# Patient Record
Sex: FEMALE | Race: WHITE | NOT HISPANIC OR LATINO | URBAN - METROPOLITAN AREA
[De-identification: names, ages, dates, MRNs, and addresses within clinical notes are randomized per-mention and may not be internally consistent; named-entity substitution may affect disease eponyms.]

---

## 2023-02-25 LAB
ALANINE AMINOTRANSFERASE (SGPT) (U/L) IN SER/PLAS: 72 U/L (ref 7–45)
ALBUMIN (G/DL) IN SER/PLAS: 4.7 G/DL (ref 3.4–5)
ALKALINE PHOSPHATASE (U/L) IN SER/PLAS: 65 U/L (ref 33–110)
ASPARTATE AMINOTRANSFERASE (SGOT) (U/L) IN SER/PLAS: 39 U/L (ref 9–39)
BILIRUBIN DIRECT (MG/DL) IN SER/PLAS: 0.1 MG/DL (ref 0–0.3)
BILIRUBIN TOTAL (MG/DL) IN SER/PLAS: 0.5 MG/DL (ref 0–1.2)
CHLAMYDIA TRACH., AMPLIFIED: NEGATIVE
FOLLITROPIN (IU/L) IN SER/PLAS: 53.6 IU/L
HEPATITIS A VIRUS IGM AB PRESENCE IN SER/PLAS BY IMMUNOASSAY: NONREACTIVE
HEPATITIS B VIRUS CORE IGM AB PRESENCE IN SER/PLAS BY IMMUNOASSY: NONREACTIVE
HEPATITIS B VIRUS SURFACE AG PRESENCE IN SERUM: NONREACTIVE
HEPATITIS C VIRUS AB PRESENCE IN SERUM: NONREACTIVE
HIV 1/ 2 AG/AB SCREEN: NONREACTIVE
LUTEINIZING HORMONE (IU/ML) IN SER/PLAS: 33.5 IU/L
N. GONORRHEA, AMPLIFIED: NEGATIVE
PROTEIN TOTAL: 7.9 G/DL (ref 6.4–8.2)

## 2023-03-01 LAB — HERPES SIMPLEX VIRUS I/II ANTIBODY, IGM: 0.95 IV

## 2023-03-03 LAB
Lab: ABNORMAL
Lab: NORMAL

## 2023-03-13 ENCOUNTER — TELEMEDICINE (OUTPATIENT)
Dept: PRIMARY CARE | Facility: CLINIC | Age: 58
End: 2023-03-13
Payer: COMMERCIAL

## 2023-03-13 DIAGNOSIS — M19.90 ARTHRITIS: ICD-10-CM

## 2023-03-13 DIAGNOSIS — R05.1 ACUTE COUGH: ICD-10-CM

## 2023-03-13 DIAGNOSIS — F17.200 SMOKER: Primary | ICD-10-CM

## 2023-03-13 DIAGNOSIS — E24.0 CUSHING'S DISEASE (MULTI): ICD-10-CM

## 2023-03-13 PROCEDURE — 99213 OFFICE O/P EST LOW 20 MIN: CPT | Performed by: INTERNAL MEDICINE

## 2023-03-13 RX ORDER — LORATADINE 10 MG/1
10 TABLET ORAL DAILY
Qty: 30 TABLET | Refills: 2 | Status: SHIPPED | OUTPATIENT
Start: 2023-03-13 | End: 2024-01-24 | Stop reason: ALTCHOICE

## 2023-03-13 RX ORDER — LEVOFLOXACIN 250 MG/1
250 TABLET ORAL DAILY
Qty: 10 TABLET | Refills: 0 | Status: SHIPPED | OUTPATIENT
Start: 2023-03-13 | End: 2023-03-23

## 2023-03-13 RX ORDER — FLUTICASONE FUROATE 27.5 UG/1
1 SPRAY, METERED NASAL
Qty: 10 G | Refills: 5 | Status: SHIPPED | OUTPATIENT
Start: 2023-03-13 | End: 2024-01-24 | Stop reason: ALTCHOICE

## 2023-03-13 RX ORDER — BROMPHENIRAMINE MALEATE, PSEUDOEPHEDRINE HYDROCHLORIDE, AND DEXTROMETHORPHAN HYDROBROMIDE 2; 30; 10 MG/5ML; MG/5ML; MG/5ML
5 SYRUP ORAL 4 TIMES DAILY PRN
Qty: 120 ML | Refills: 0 | Status: SHIPPED | OUTPATIENT
Start: 2023-03-13 | End: 2023-03-23

## 2023-03-13 NOTE — PROGRESS NOTES
VIRTUAL VISIT    NAME OF THE PATIENT: Kallie Dale    DATE OF BIRTH:     CHIEF COMPLAINT:  The patient today came here for a virtual visit.  Cough, yellow sputum, sinus congestion, bronchitis, headache, postnasal congestion going on for the last several days.  Over-the-counter medications not helping.  She had a COVID two weeks ago, it was negative.    PAST MEDICAL HISTORY:  Reviewed on EMR, unchanged.    CURRENT MEDICATIONS:  Reviewed on EMR, unchanged.  List reviewed.    ALLERGIES:  Reviewed on EMR, unchanged.    SOCIAL HISTORY:  Reviewed on EMR, unchanged.  She does not smoke and does not drink alcohol.    FAMILY HISTORY:  Reviewed on EMR, unchanged.    REVIEW OF SYSTEMS:  All 12 systems reviewed and pertaining covered in history and physical.    PHYSICAL EXAMINATION  Virtual exam.  Nose and throat congested.  Postnasal drip.  Bilateral wheezing present.    LAB WORK:  Laboratory testing discussed.    ASSESSMENT AND PLAN:  Rhinosinusitis, pharyngitis, and bronchitis.  Saline gargles and steam inhalation.  Levaquin, Claritin, Robitussin, Flonase, Symbicort and cough syrup.  Follow up in two weeks if not better.  Continue to follow.      Kindly review this note in conjunction with EMR.     Subjective   Patient ID: Kallie Dale is a 57 y.o. female who presents for No chief complaint on file..      HPI    Review of Systems    Objective   There were no vitals taken for this visit.      Physical Exam    Assessment/Plan   Problem List Items Addressed This Visit    None  Visit Diagnoses       Smoker    -  Primary    Arthritis        Cushing's disease (CMS/HCC)        Acute cough

## 2023-03-19 DIAGNOSIS — I10 PRIMARY HYPERTENSION: Primary | ICD-10-CM

## 2023-03-19 RX ORDER — HYDROCHLOROTHIAZIDE 25 MG/1
TABLET ORAL
Qty: 25 TABLET | Refills: 2 | Status: SHIPPED | OUTPATIENT
Start: 2023-03-19 | End: 2024-01-24 | Stop reason: ALTCHOICE

## 2023-03-21 ENCOUNTER — TELEMEDICINE (OUTPATIENT)
Dept: PHARMACY | Facility: HOSPITAL | Age: 58
End: 2023-03-21
Payer: COMMERCIAL

## 2023-03-21 RX ORDER — SEMAGLUTIDE 1.34 MG/ML
0.5 INJECTION, SOLUTION SUBCUTANEOUS
Qty: 1.5 ML | Refills: 0 | Status: SHIPPED | OUTPATIENT
Start: 2023-03-21 | End: 2023-04-18 | Stop reason: ALTCHOICE

## 2023-03-21 NOTE — PROGRESS NOTES
Subjective   Patient ID: Kallie Dale is a 57 y.o. female who presents for Ozempic management.    Referring Provider: Lisa Carrillo MD     HPI    Review of Systems    Objective     There were no vitals taken for this visit.     Labs  Lab Results   Component Value Date    BILITOT 0.5 02/24/2023    CALCIUM 9.3 11/02/2022    CO2 27 11/02/2022     11/02/2022    CREATININE 0.96 11/02/2022    GLUCOSE 123 (H) 11/02/2022    ALKPHOS 65 02/24/2023    K 3.7 11/02/2022    PROT 7.9 02/24/2023     11/02/2022    AST 39 02/24/2023    ALT 72 (H) 02/24/2023    BUN 6 11/02/2022    ANIONGAP 19 11/02/2022     12/06/2021    ALBUMIN 4.7 02/24/2023    AMYLASE 38 03/09/2021    LIPASE 61 03/09/2021    GFRF 69 11/02/2022     Lab Results   Component Value Date    TRIG 110 09/11/2020    CHOL 212 (H) 09/11/2020    HDL 76.0 09/11/2020     Lab Results   Component Value Date    HGBA1C 5.6 11/02/2022       Assessment/Plan         Patient is doing well on medication will increase her to 0.5mg dose for weight loss. PA was denied, but patient is okay paying cash.    Diamond Holt, Mary AnnD

## 2023-04-18 ENCOUNTER — TELEMEDICINE (OUTPATIENT)
Dept: PHARMACY | Facility: HOSPITAL | Age: 58
End: 2023-04-18
Payer: COMMERCIAL

## 2023-04-18 RX ORDER — SEMAGLUTIDE 1.34 MG/ML
1 INJECTION, SOLUTION SUBCUTANEOUS
Qty: 3 ML | Refills: 0 | Status: SHIPPED | OUTPATIENT
Start: 2023-04-18 | End: 2024-02-12 | Stop reason: WASHOUT

## 2023-04-18 NOTE — PROGRESS NOTES
Subjective   Patient ID: Kallie Dale is a 57 y.o. female who presents for Obesity (Ozempic titration). No weight loss reproted at this time, but tolerating well. Just took second dose of Ozempic 0.5 mg.     Objective   There were no vitals taken for this visit.    Physical Exam    Assessment/Plan   Diagnoses and all orders for this visit:  Body mass index 36.0-36.9, adult  -     Follow Up In [facility]    Patient tolerating Ozempic 0.5 mg weekly well; has taken two doses. Will uptitrate in 2 weeks to 1 mg weekly. Follow-up in 4 weeks.

## 2023-04-18 NOTE — PATIENT INSTRUCTIONS
Ozempic Education:     - Counseled patient on Ozempic MOA, expectations, side effects, duration of therapy, administration, and monitoring parameters.  - Provided detailed dosing and administration counseling to ensure proper technique.   - Reviewed Ozempic titration schedule, starting with 0.25 mg once weekly for 4 weeks, then continuing on 0.5 mg once weekly. Pt verbalized understanding.  - Counseled patient on the benefits of GLP-1ra, such as cardiovascular risk reduction, glycemic control, and weight loss potential.  - Reviewed storage requirements of Ozempic when not in use, and when to administer the medication if a dose is missed.  - Advised patient that they may experience improved satiety after meals and portion sizes of meals may be reduced as doses of Ozempic increase.  - Counseled patient to avoid foods that are fatty/oily as this may precipitate the nausea/GI upset that may occur with new start Ozempic.

## 2023-05-10 ENCOUNTER — APPOINTMENT (OUTPATIENT)
Dept: PHARMACY | Facility: HOSPITAL | Age: 58
End: 2023-05-10
Payer: COMMERCIAL

## 2023-07-10 DIAGNOSIS — F41.9 ANXIETY: ICD-10-CM

## 2023-07-10 DIAGNOSIS — I10 HYPERTENSION, UNSPECIFIED TYPE: ICD-10-CM

## 2023-07-10 DIAGNOSIS — K21.9 GASTROESOPHAGEAL REFLUX DISEASE, UNSPECIFIED WHETHER ESOPHAGITIS PRESENT: ICD-10-CM

## 2023-07-10 DIAGNOSIS — N94.9 VAGINAL BURNING: ICD-10-CM

## 2023-07-10 RX ORDER — VALACYCLOVIR HYDROCHLORIDE 500 MG/1
500 TABLET, FILM COATED ORAL 3 TIMES DAILY
COMMUNITY
Start: 2023-03-04 | End: 2023-07-10 | Stop reason: SDUPTHER

## 2023-07-10 RX ORDER — METOPROLOL SUCCINATE 50 MG/1
TABLET, EXTENDED RELEASE ORAL
Qty: 30 TABLET | Refills: 0 | Status: SHIPPED | OUTPATIENT
Start: 2023-07-10 | End: 2023-08-03

## 2023-07-10 RX ORDER — OMEPRAZOLE 40 MG/1
CAPSULE, DELAYED RELEASE ORAL
Qty: 90 CAPSULE | Refills: 1 | Status: SHIPPED | OUTPATIENT
Start: 2023-07-10

## 2023-07-10 RX ORDER — ESCITALOPRAM OXALATE 20 MG/1
20 TABLET ORAL DAILY
Qty: 30 TABLET | Refills: 0 | Status: SHIPPED | OUTPATIENT
Start: 2023-07-10 | End: 2023-09-27

## 2023-07-10 RX ORDER — VALACYCLOVIR HYDROCHLORIDE 500 MG/1
500 TABLET, FILM COATED ORAL 3 TIMES DAILY
Qty: 30 TABLET | Refills: 0 | Status: SHIPPED | OUTPATIENT
Start: 2023-07-10 | End: 2023-07-20

## 2023-07-10 RX ORDER — ESCITALOPRAM OXALATE 20 MG/1
20 TABLET ORAL DAILY
COMMUNITY
Start: 2023-04-18 | End: 2023-07-10 | Stop reason: SDUPTHER

## 2023-08-03 DIAGNOSIS — I10 HYPERTENSION, UNSPECIFIED TYPE: ICD-10-CM

## 2023-08-03 RX ORDER — METOPROLOL SUCCINATE 50 MG/1
TABLET, EXTENDED RELEASE ORAL
Qty: 30 TABLET | Refills: 0 | Status: SHIPPED | OUTPATIENT
Start: 2023-08-03 | End: 2023-08-22

## 2023-08-17 ENCOUNTER — OFFICE VISIT (OUTPATIENT)
Dept: PRIMARY CARE | Facility: CLINIC | Age: 58
End: 2023-08-17
Payer: COMMERCIAL

## 2023-08-17 VITALS
SYSTOLIC BLOOD PRESSURE: 138 MMHG | HEIGHT: 64 IN | BODY MASS INDEX: 34.49 KG/M2 | DIASTOLIC BLOOD PRESSURE: 72 MMHG | WEIGHT: 202 LBS

## 2023-08-17 DIAGNOSIS — R07.9 CHEST PAIN, UNSPECIFIED TYPE: ICD-10-CM

## 2023-08-17 DIAGNOSIS — E78.5 HYPERLIPIDEMIA, UNSPECIFIED HYPERLIPIDEMIA TYPE: ICD-10-CM

## 2023-08-17 DIAGNOSIS — R05.1 ACUTE COUGH: ICD-10-CM

## 2023-08-17 DIAGNOSIS — R73.03 PREDIABETES: ICD-10-CM

## 2023-08-17 PROBLEM — I82.409 DVT (DEEP VENOUS THROMBOSIS) (MULTI): Status: ACTIVE | Noted: 2023-08-17

## 2023-08-17 PROBLEM — M19.90 ARTHRITIS: Status: ACTIVE | Noted: 2023-08-17

## 2023-08-17 PROBLEM — T78.40XA ALLERGIES: Status: ACTIVE | Noted: 2023-08-17

## 2023-08-17 PROBLEM — K92.1 BLACK STOOL: Status: ACTIVE | Noted: 2023-08-17

## 2023-08-17 PROBLEM — F41.8 DEPRESSION WITH ANXIETY: Status: ACTIVE | Noted: 2023-08-17

## 2023-08-17 PROBLEM — L70.9 ACNE: Status: ACTIVE | Noted: 2023-08-17

## 2023-08-17 PROBLEM — R10.9 ABDOMINAL PAIN: Status: ACTIVE | Noted: 2023-08-17

## 2023-08-17 PROBLEM — R63.5 ABNORMAL WEIGHT GAIN: Status: ACTIVE | Noted: 2023-08-17

## 2023-08-17 PROBLEM — B37.9 CANDIDIASIS: Status: ACTIVE | Noted: 2023-08-17

## 2023-08-17 PROBLEM — F41.9 ANXIETY: Status: ACTIVE | Noted: 2023-08-17

## 2023-08-17 PROCEDURE — 93000 ELECTROCARDIOGRAM COMPLETE: CPT | Performed by: INTERNAL MEDICINE

## 2023-08-17 PROCEDURE — 1036F TOBACCO NON-USER: CPT | Performed by: INTERNAL MEDICINE

## 2023-08-17 PROCEDURE — 99214 OFFICE O/P EST MOD 30 MIN: CPT | Performed by: INTERNAL MEDICINE

## 2023-08-17 RX ORDER — ALBUTEROL SULFATE 90 UG/1
AEROSOL, METERED RESPIRATORY (INHALATION)
COMMUNITY
Start: 2023-02-23 | End: 2024-01-24 | Stop reason: ALTCHOICE

## 2023-09-27 DIAGNOSIS — F41.9 ANXIETY: ICD-10-CM

## 2023-09-27 RX ORDER — ESCITALOPRAM OXALATE 20 MG/1
20 TABLET ORAL DAILY
Qty: 30 TABLET | Refills: 2 | Status: SHIPPED | OUTPATIENT
Start: 2023-09-27 | End: 2024-02-25

## 2023-10-09 DIAGNOSIS — R63.5 ABNORMAL WEIGHT GAIN: ICD-10-CM

## 2023-10-11 ENCOUNTER — PHARMACY VISIT (OUTPATIENT)
Dept: PHARMACY | Facility: CLINIC | Age: 58
End: 2023-10-11

## 2023-10-11 PROCEDURE — RXMED WILLOW AMBULATORY MEDICATION CHARGE

## 2023-10-24 DIAGNOSIS — N94.9 VAGINAL BURNING: ICD-10-CM

## 2023-10-24 PROBLEM — K21.9 GERD (GASTROESOPHAGEAL REFLUX DISEASE): Status: ACTIVE | Noted: 2023-10-24

## 2023-10-24 PROBLEM — E66.812 OBESITY, CLASS II, BMI 35-39.9: Status: ACTIVE | Noted: 2023-07-26

## 2023-10-24 PROBLEM — E66.3 OVERWEIGHT: Status: ACTIVE | Noted: 2023-10-24

## 2023-10-24 PROBLEM — R21 RASH AND OTHER NONSPECIFIC SKIN ERUPTION: Status: ACTIVE | Noted: 2022-04-06

## 2023-10-24 PROBLEM — N89.8 VAGINAL DISCHARGE: Status: ACTIVE | Noted: 2023-10-24

## 2023-10-24 PROBLEM — E66.9 OBESITY, CLASS II, BMI 35-39.9: Status: ACTIVE | Noted: 2023-07-26

## 2023-10-24 PROBLEM — G47.00 INSOMNIA: Status: ACTIVE | Noted: 2023-10-24

## 2023-10-24 PROBLEM — M46.40: Status: ACTIVE | Noted: 2023-10-24

## 2023-10-24 PROBLEM — M70.62 TROCHANTERIC BURSITIS OF LEFT HIP: Status: ACTIVE | Noted: 2023-10-24

## 2023-10-24 PROBLEM — H66.90 EAR INFECTION: Status: ACTIVE | Noted: 2023-10-24

## 2023-10-24 PROBLEM — M48.061 LUMBAR CANAL STENOSIS: Status: ACTIVE | Noted: 2023-10-24

## 2023-10-24 PROBLEM — L21.9 SEBORRHEIC DERMATITIS, UNSPECIFIED: Status: ACTIVE | Noted: 2022-04-06

## 2023-10-24 PROBLEM — B34.9 VIRAL INFECTION: Status: ACTIVE | Noted: 2023-10-24

## 2023-10-24 PROBLEM — L40.0 PSORIASIS VULGARIS: Status: ACTIVE | Noted: 2022-04-06

## 2023-10-24 PROBLEM — M25.519 SHOULDER PAIN: Status: ACTIVE | Noted: 2023-10-24

## 2023-10-24 PROBLEM — R51.9 HEADACHE: Status: ACTIVE | Noted: 2023-10-24

## 2023-10-24 PROBLEM — R63.4 WEIGHT LOSS: Status: ACTIVE | Noted: 2023-10-24

## 2023-10-24 PROBLEM — G25.81 RESTLESS LEGS SYNDROME: Status: ACTIVE | Noted: 2023-10-24

## 2023-10-24 PROBLEM — M54.50 LOWER BACK PAIN: Status: ACTIVE | Noted: 2023-10-24

## 2023-10-24 PROBLEM — I10 HYPERTENSION: Status: ACTIVE | Noted: 2023-10-24

## 2023-10-24 PROBLEM — K22.10 ULCER, ESOPHAGUS: Status: ACTIVE | Noted: 2023-10-24

## 2023-10-24 PROBLEM — J02.9 SORE THROAT: Status: ACTIVE | Noted: 2023-10-24

## 2023-10-24 RX ORDER — VALACYCLOVIR HYDROCHLORIDE 500 MG/1
500 TABLET, FILM COATED ORAL DAILY
Qty: 30 TABLET | Refills: 1 | Status: SHIPPED | OUTPATIENT
Start: 2023-10-24 | End: 2023-11-06 | Stop reason: SDUPTHER

## 2023-11-01 ENCOUNTER — PHARMACY VISIT (OUTPATIENT)
Dept: PHARMACY | Facility: CLINIC | Age: 58
End: 2023-11-01

## 2023-11-01 DIAGNOSIS — R63.5 ABNORMAL WEIGHT GAIN: ICD-10-CM

## 2023-11-01 RX ORDER — SEMAGLUTIDE 0.68 MG/ML
0.5 INJECTION, SOLUTION SUBCUTANEOUS
Qty: 3 ML | Refills: 0 | Status: SHIPPED | OUTPATIENT
Start: 2023-11-01 | End: 2024-01-09 | Stop reason: ENTERED-IN-ERROR

## 2023-11-03 ENCOUNTER — APPOINTMENT (OUTPATIENT)
Dept: PRIMARY CARE | Facility: CLINIC | Age: 58
End: 2023-11-03
Payer: COMMERCIAL

## 2023-11-06 ENCOUNTER — OFFICE VISIT (OUTPATIENT)
Dept: PRIMARY CARE | Facility: CLINIC | Age: 58
End: 2023-11-06
Payer: COMMERCIAL

## 2023-11-06 VITALS — HEIGHT: 64 IN | DIASTOLIC BLOOD PRESSURE: 74 MMHG | SYSTOLIC BLOOD PRESSURE: 128 MMHG | BODY MASS INDEX: 34.67 KG/M2

## 2023-11-06 DIAGNOSIS — N94.9 VAGINAL BURNING: ICD-10-CM

## 2023-11-06 DIAGNOSIS — F41.9 ANXIETY AND DEPRESSION: ICD-10-CM

## 2023-11-06 DIAGNOSIS — F32.A ANXIETY AND DEPRESSION: ICD-10-CM

## 2023-11-06 DIAGNOSIS — I10 PRIMARY HYPERTENSION: ICD-10-CM

## 2023-11-06 DIAGNOSIS — R73.03 PREDIABETES: Primary | ICD-10-CM

## 2023-11-06 DIAGNOSIS — E78.5 HYPERLIPIDEMIA, UNSPECIFIED HYPERLIPIDEMIA TYPE: ICD-10-CM

## 2023-11-06 PROCEDURE — 3008F BODY MASS INDEX DOCD: CPT | Performed by: INTERNAL MEDICINE

## 2023-11-06 PROCEDURE — 99213 OFFICE O/P EST LOW 20 MIN: CPT | Performed by: INTERNAL MEDICINE

## 2023-11-06 PROCEDURE — 3074F SYST BP LT 130 MM HG: CPT | Performed by: INTERNAL MEDICINE

## 2023-11-06 PROCEDURE — 1036F TOBACCO NON-USER: CPT | Performed by: INTERNAL MEDICINE

## 2023-11-06 PROCEDURE — 3078F DIAST BP <80 MM HG: CPT | Performed by: INTERNAL MEDICINE

## 2023-11-06 RX ORDER — VALACYCLOVIR HYDROCHLORIDE 500 MG/1
500 TABLET, FILM COATED ORAL DAILY
Qty: 30 TABLET | Refills: 1 | Status: SHIPPED | OUTPATIENT
Start: 2023-11-06 | End: 2023-11-30

## 2023-11-07 PROBLEM — N94.9 VAGINAL BURNING: Status: ACTIVE | Noted: 2023-11-07

## 2023-11-07 PROBLEM — F41.9 ANXIETY AND DEPRESSION: Status: ACTIVE | Noted: 2023-11-07

## 2023-11-07 PROBLEM — E78.5 HYPERLIPIDEMIA: Status: ACTIVE | Noted: 2023-11-07

## 2023-11-07 PROBLEM — R73.03 PREDIABETES: Status: ACTIVE | Noted: 2023-11-07

## 2023-11-07 PROBLEM — N94.89 VAGINAL BURNING: Status: ACTIVE | Noted: 2023-11-07

## 2023-11-07 PROBLEM — F32.A ANXIETY AND DEPRESSION: Status: ACTIVE | Noted: 2023-11-07

## 2023-11-07 NOTE — PROGRESS NOTES
"Subjective   Patient ID: Kallie Dale is a 58 y.o. female who presents for Follow-up (multiple medical issues.).    Kallie Dale today came here for multiple medical issues.  She lost eight pounds in the last one month of Ozempic, high-dose.  She wants to start again.  Appetite and weight are okay.  No problem.  She came here for follow-up on various conditions.  No side effects.    I have personally reviewed the patient's Past Medical History, Medications, Allergies, Social History, and Family History in the EMR.    Review of Systems   All other systems reviewed and are negative.    Objective   /74   Ht 1.626 m (5' 4\")   BMI 34.67 kg/m²     Physical Exam  Vitals reviewed.   Cardiovascular:      Heart sounds: Normal heart sounds, S1 normal and S2 normal. No murmur heard.     No friction rub.   Pulmonary:      Effort: Pulmonary effort is normal.      Breath sounds: Normal breath sounds and air entry.   Abdominal:      Palpations: There is no hepatomegaly, splenomegaly or mass.   Musculoskeletal:      Right lower leg: No edema.      Left lower leg: No edema.   Lymphadenopathy:      Lower Body: No right inguinal adenopathy. No left inguinal adenopathy.   Neurological:      Cranial Nerves: Cranial nerves 2-12 are intact.      Sensory: No sensory deficit.      Motor: Motor function is intact.      Deep Tendon Reflexes: Reflexes are normal and symmetric.     LAB WORK: Laboratory testing discussed.    Assessment/Plan   Problem List Items Addressed This Visit             ICD-10-CM       Cardiac and Vasculature    Hypertension I10    Relevant Orders    CBC    Urinalysis with Reflex Microscopic    TSH     Other Visit Diagnoses         Codes    Anxiety and depression    -  Primary F41.9, F32.A    Vaginal burning     N94.9    Relevant Medications    valACYclovir (Valtrex) 500 mg tablet    Prediabetes     R73.03    Relevant Orders    Hemoglobin A1c    Hyperlipidemia, unspecified hyperlipidemia type     E78.5    " Relevant Orders    Comprehensive metabolic panel    Lipid panel        1. Pre-diabetic, diabetic.  Ozempic is helping.  Go with high dose.  2. Anxiety and depression, okay.  3. Hypertension, okay.  Metoprolol.  No side effects.  4. Anxiety, stable.  5. I shall see her to do blood test.  6. We are going to call Ozempic now.  I told her there is ______ cash pay, but she might be a candidate, she might be able to get dispensing pharmacy, cheaper option.  She is willing to try.    Scribe Attestation  By signing my name below, I, Juan Piña attest that this documentation has been prepared under the direction and in the presence of Bertram Carrillo MD.

## 2023-11-30 DIAGNOSIS — N94.9 VAGINAL BURNING: ICD-10-CM

## 2023-11-30 RX ORDER — VALACYCLOVIR HYDROCHLORIDE 500 MG/1
500 TABLET, FILM COATED ORAL DAILY
Qty: 90 TABLET | Refills: 1 | Status: SHIPPED | OUTPATIENT
Start: 2023-11-30

## 2024-01-06 ENCOUNTER — APPOINTMENT (OUTPATIENT)
Dept: PRIMARY CARE | Facility: CLINIC | Age: 59
End: 2024-01-06
Payer: COMMERCIAL

## 2024-01-08 ENCOUNTER — OFFICE VISIT (OUTPATIENT)
Dept: PRIMARY CARE | Facility: CLINIC | Age: 59
End: 2024-01-08
Payer: COMMERCIAL

## 2024-01-08 ENCOUNTER — LAB (OUTPATIENT)
Dept: LAB | Facility: LAB | Age: 59
End: 2024-01-08
Payer: COMMERCIAL

## 2024-01-08 VITALS — SYSTOLIC BLOOD PRESSURE: 126 MMHG | DIASTOLIC BLOOD PRESSURE: 74 MMHG

## 2024-01-08 DIAGNOSIS — I10 PRIMARY HYPERTENSION: ICD-10-CM

## 2024-01-08 DIAGNOSIS — B00.9 HERPES SIMPLEX INFECTION: ICD-10-CM

## 2024-01-08 DIAGNOSIS — F41.9 ANXIETY: ICD-10-CM

## 2024-01-08 DIAGNOSIS — M54.9 BACK PAIN, UNSPECIFIED BACK LOCATION, UNSPECIFIED BACK PAIN LATERALITY, UNSPECIFIED CHRONICITY: ICD-10-CM

## 2024-01-08 DIAGNOSIS — M48.00 SPINAL STENOSIS, UNSPECIFIED SPINAL REGION: ICD-10-CM

## 2024-01-08 DIAGNOSIS — R63.5 WEIGHT GAIN: ICD-10-CM

## 2024-01-08 DIAGNOSIS — R39.9 UTI SYMPTOMS: ICD-10-CM

## 2024-01-08 DIAGNOSIS — E78.5 HYPERLIPIDEMIA, UNSPECIFIED HYPERLIPIDEMIA TYPE: ICD-10-CM

## 2024-01-08 DIAGNOSIS — R35.0 INCREASED FREQUENCY OF MICTURITION: Primary | ICD-10-CM

## 2024-01-08 DIAGNOSIS — R73.03 PRE-DIABETES: ICD-10-CM

## 2024-01-08 DIAGNOSIS — R32 URINARY INCONTINENCE, UNSPECIFIED TYPE: ICD-10-CM

## 2024-01-08 LAB
ERYTHROCYTE [DISTWIDTH] IN BLOOD BY AUTOMATED COUNT: 12.8 % (ref 11.5–14.5)
HCT VFR BLD AUTO: 41.8 % (ref 36–46)
HGB BLD-MCNC: 13.5 G/DL (ref 12–16)
MCH RBC QN AUTO: 31.2 PG (ref 26–34)
MCHC RBC AUTO-ENTMCNC: 32.3 G/DL (ref 32–36)
MCV RBC AUTO: 97 FL (ref 80–100)
NRBC BLD-RTO: 0.3 /100 WBCS (ref 0–0)
PLATELET # BLD AUTO: 267 X10*3/UL (ref 150–450)
RBC # BLD AUTO: 4.33 X10*6/UL (ref 4–5.2)
WBC # BLD AUTO: 6.9 X10*3/UL (ref 4.4–11.3)

## 2024-01-08 PROCEDURE — 3008F BODY MASS INDEX DOCD: CPT | Performed by: INTERNAL MEDICINE

## 2024-01-08 PROCEDURE — 36415 COLL VENOUS BLD VENIPUNCTURE: CPT

## 2024-01-08 PROCEDURE — 3078F DIAST BP <80 MM HG: CPT | Performed by: INTERNAL MEDICINE

## 2024-01-08 PROCEDURE — 84443 ASSAY THYROID STIM HORMONE: CPT

## 2024-01-08 PROCEDURE — 3074F SYST BP LT 130 MM HG: CPT | Performed by: INTERNAL MEDICINE

## 2024-01-08 PROCEDURE — 1036F TOBACCO NON-USER: CPT | Performed by: INTERNAL MEDICINE

## 2024-01-08 PROCEDURE — 85027 COMPLETE CBC AUTOMATED: CPT

## 2024-01-08 PROCEDURE — 80053 COMPREHEN METABOLIC PANEL: CPT

## 2024-01-08 PROCEDURE — 99214 OFFICE O/P EST MOD 30 MIN: CPT | Performed by: INTERNAL MEDICINE

## 2024-01-09 LAB
ALBUMIN SERPL BCP-MCNC: 4.3 G/DL (ref 3.4–5)
ALP SERPL-CCNC: 57 U/L (ref 33–110)
ALT SERPL W P-5'-P-CCNC: 25 U/L (ref 7–45)
ANION GAP SERPL CALC-SCNC: 16 MMOL/L (ref 10–20)
AST SERPL W P-5'-P-CCNC: 21 U/L (ref 9–39)
BILIRUB SERPL-MCNC: 0.5 MG/DL (ref 0–1.2)
BUN SERPL-MCNC: 10 MG/DL (ref 6–23)
CALCIUM SERPL-MCNC: 9.7 MG/DL (ref 8.6–10.6)
CHLORIDE SERPL-SCNC: 103 MMOL/L (ref 98–107)
CO2 SERPL-SCNC: 26 MMOL/L (ref 21–32)
CREAT SERPL-MCNC: 0.81 MG/DL (ref 0.5–1.05)
EGFRCR SERPLBLD CKD-EPI 2021: 84 ML/MIN/1.73M*2
GLUCOSE SERPL-MCNC: 96 MG/DL (ref 74–99)
POTASSIUM SERPL-SCNC: 4.8 MMOL/L (ref 3.5–5.3)
PROT SERPL-MCNC: 7 G/DL (ref 6.4–8.2)
SODIUM SERPL-SCNC: 140 MMOL/L (ref 136–145)
TSH SERPL-ACNC: 1.97 MIU/L (ref 0.44–3.98)

## 2024-01-09 NOTE — PROGRESS NOTES
Subjective   Patient ID: Kallie Dale is a 58 y.o. female who presents for Follow-up (multiple medical issues.).    Kallie Dale today came here for multiple medical issues.  1. Sudden loss of control of urine and bowel.  Every time she has urine comes out.  She has spinal stenosis.  She thinks it is related to that.  Urinary incontinence.  She is very worried about it.  2. Follow-up on other conditions.  She is ready take the second dose of Ozempic to lose the weight.  It is helping her.  Feeling tired, fatigued, and exhausted.  She is very depressed because of concentration.    I have personally reviewed the patient's Past Medical History, Medications, Allergies, Social History, and Family History in the EMR.    Review of Systems   All other systems reviewed and are negative.    Objective   /74     Physical Exam  Vitals reviewed.   Cardiovascular:      Heart sounds: Normal heart sounds, S1 normal and S2 normal. No murmur heard.     No friction rub.   Pulmonary:      Effort: Pulmonary effort is normal.      Breath sounds: Normal breath sounds and air entry.   Abdominal:      Palpations: There is no hepatomegaly, splenomegaly or mass.   Musculoskeletal:      Right lower leg: No edema.      Left lower leg: No edema.      Comments: EXTREMITIES: Movements painful.   Lymphadenopathy:      Lower Body: No right inguinal adenopathy. No left inguinal adenopathy.   Neurological:      Cranial Nerves: Cranial nerves 2-12 are intact.      Sensory: No sensory deficit.      Motor: Motor function is intact.      Deep Tendon Reflexes: Reflexes are normal and symmetric.     LAB WORK:  Laboratory testing discussed.    Assessment/Plan   Problem List Items Addressed This Visit             ICD-10-CM       Cardiac and Vasculature    Hypertension I10    Relevant Orders    CBC (Completed)    TSH (Completed)    Hyperlipidemia E78.5    Relevant Orders    Comprehensive metabolic panel (Completed)       Mental Health    Anxiety  F41.9     Other Visit Diagnoses         Codes    Increased frequency of micturition    -  Primary R35.0    Urinary incontinence, unspecified type     R32    Relevant Orders    US renal complete    US bladder    US PELVIS TRANSABDOMINAL WITH TRANSVAGINAL    Referral to Urology    Back pain, unspecified back location, unspecified back pain laterality, unspecified chronicity     M54.9    Relevant Orders    Referral to Neurology    UTI symptoms     R39.9    Spinal stenosis, unspecified spinal region     M48.00    Weight gain     R63.5    Pre-diabetes     R73.03    Herpes simplex infection     B00.9        1. Increased frequency of urine, UTI symptoms.  Ultrasound of the kidney, bladder and pelvis.  2. Sudden urinary incontinence, spinal stenosis.  Refer to Dr. Jase Palmer.  She has an MRI at the [facility].  3. Weight gain.  I ordered thyroid and fasting sugar.  4. Pre-diabetic and weight.  Wegovy working, higher dose given.  5. Blood pressure and hypertension, okay.  6. Anxiety.  Monitor.  7. Herpes simplex infection getting better.  8. Follow-up appointment with me in a week after all the testing.    Scribe Attestation  By signing my name below, I, Juan Piña attest that this documentation has been prepared under the direction and in the presence of Bertram Carrillo MD.

## 2024-01-11 ENCOUNTER — OFFICE VISIT (OUTPATIENT)
Dept: PRIMARY CARE | Facility: CLINIC | Age: 59
End: 2024-01-11
Payer: COMMERCIAL

## 2024-01-11 VITALS
BODY MASS INDEX: 38.58 KG/M2 | HEIGHT: 64 IN | SYSTOLIC BLOOD PRESSURE: 142 MMHG | WEIGHT: 226 LBS | DIASTOLIC BLOOD PRESSURE: 86 MMHG

## 2024-01-11 DIAGNOSIS — F41.9 ANXIETY AND DEPRESSION: ICD-10-CM

## 2024-01-11 DIAGNOSIS — I10 HYPERTENSION, UNSPECIFIED TYPE: ICD-10-CM

## 2024-01-11 DIAGNOSIS — F32.A ANXIETY AND DEPRESSION: ICD-10-CM

## 2024-01-11 DIAGNOSIS — R53.83 FATIGUE, UNSPECIFIED TYPE: Primary | ICD-10-CM

## 2024-01-11 DIAGNOSIS — A60.00 GENITAL HERPES SIMPLEX, UNSPECIFIED SITE: ICD-10-CM

## 2024-01-11 PROCEDURE — 3008F BODY MASS INDEX DOCD: CPT | Performed by: INTERNAL MEDICINE

## 2024-01-11 PROCEDURE — 3079F DIAST BP 80-89 MM HG: CPT | Performed by: INTERNAL MEDICINE

## 2024-01-11 PROCEDURE — 1036F TOBACCO NON-USER: CPT | Performed by: INTERNAL MEDICINE

## 2024-01-11 PROCEDURE — 3077F SYST BP >= 140 MM HG: CPT | Performed by: INTERNAL MEDICINE

## 2024-01-11 PROCEDURE — 99214 OFFICE O/P EST MOD 30 MIN: CPT | Performed by: INTERNAL MEDICINE

## 2024-01-11 ASSESSMENT — ENCOUNTER SYMPTOMS
OCCASIONAL FEELINGS OF UNSTEADINESS: 0
DEPRESSION: 0
LOSS OF SENSATION IN FEET: 0

## 2024-01-12 NOTE — PROGRESS NOTES
2024        Ref:  Kallie Dale  :  1965       To Whom It May Concern:    Ms. Kallie Dale has given me permission to discuss her medical condition.    This letter she asked me to write it for her possible legal issues, for personal use.    This lady has been my patient for several years.  She saw me very specific on 2023 because of some itching and rash in the vaginal area, for which a detailed exam was done, culture was sent.  She was treated empirically.    Further that, herpes antibody IgM came positive.    Lesion, clinical exam confirmed it.  She was treated.    Currently, she is continuously on Valtrex for preventive treatment because in the past tried to remove it, it got flare-up again.    Because of multiple issues, she is under a lot of mental stress that might be contributing to it.    This therapy at the moment we will continue.  We are monitoring her kidney function.    The patient told me that she always has been in monogamous relationship and I have no record to say that she ever suffered genital herpes in the past in my available record.    If you have any questions do not hesitate to contact my office.    Yours Sincerely,      Bertram Carrillo M.D.    P.S.: This whole letter is based on data available to me at [facility] computer system, in old and current computer system.

## 2024-01-12 NOTE — PROGRESS NOTES
"Subjective   Patient ID: Kallie Dale is a 58 y.o. female who presents for Follow-up (multiple medical issues).    Kallie Dale today came here for multiple medical issues.  She is very tired, fatigued, exhausted, not feeling good.  She snores at night.  She is concerned.  She cannot lose weight. She came for follow-up on various conditions.    I have personally reviewed the patient's Past Medical History, Medications, Allergies, Social History, and Family History in the EMR.    Review of Systems   All other systems reviewed and are negative.    Objective   /86   Ht 1.626 m (5' 4\")   Wt 103 kg (226 lb)   BMI 38.79 kg/m²     Physical Exam  Vitals reviewed.   Cardiovascular:      Heart sounds: Normal heart sounds, S1 normal and S2 normal. No murmur heard.     No friction rub.   Pulmonary:      Effort: Pulmonary effort is normal.      Breath sounds: Normal breath sounds and air entry.   Abdominal:      Palpations: There is no hepatomegaly, splenomegaly or mass.   Musculoskeletal:      Right lower leg: No edema.      Left lower leg: No edema.   Lymphadenopathy:      Lower Body: No right inguinal adenopathy. No left inguinal adenopathy.   Neurological:      Cranial Nerves: Cranial nerves 2-12 are intact.      Sensory: No sensory deficit.      Motor: Motor function is intact.      Deep Tendon Reflexes: Reflexes are normal and symmetric.     LAB WORK: Laboratory testing discussed.    Assessment/Plan   Problem List Items Addressed This Visit             ICD-10-CM       Cardiac and Vasculature    Hypertension I10       Mental Health    Anxiety and depression F41.9, F32.A     Other Visit Diagnoses         Codes    Fatigue, unspecified type    -  Primary R53.83    Genital herpes simplex, unspecified site     A60.00        1. Feeling tired, fatigued, exhausted, snoring, increased daytime somnolence.  Immediate sleep test, it should have been done.  It could be cause, she cannot visit.  2. Hypertension, okay.  3. " Anxiety and depression, okay.  4. Herpes genitalis infection.  She has to take Valtrex.  Continue at the moment because of recurrence issues.  5. I shall see her back in a week after testing.    Juan Attestation  By signing my name below, I, Juan Piña attest that this documentation has been prepared under the direction and in the presence of Bertram Carrillo MD.

## 2024-01-17 ENCOUNTER — ANCILLARY PROCEDURE (OUTPATIENT)
Dept: RADIOLOGY | Facility: CLINIC | Age: 59
End: 2024-01-17
Payer: COMMERCIAL

## 2024-01-17 ENCOUNTER — APPOINTMENT (OUTPATIENT)
Dept: RADIOLOGY | Facility: CLINIC | Age: 59
End: 2024-01-17
Payer: COMMERCIAL

## 2024-01-17 DIAGNOSIS — R32 URINARY INCONTINENCE, UNSPECIFIED TYPE: ICD-10-CM

## 2024-01-17 PROCEDURE — 76770 US EXAM ABDO BACK WALL COMP: CPT | Performed by: RADIOLOGY

## 2024-01-17 PROCEDURE — 76770 US EXAM ABDO BACK WALL COMP: CPT

## 2024-01-23 ENCOUNTER — APPOINTMENT (OUTPATIENT)
Dept: RADIOLOGY | Facility: CLINIC | Age: 59
End: 2024-01-23
Payer: COMMERCIAL

## 2024-01-24 ENCOUNTER — TELEMEDICINE (OUTPATIENT)
Dept: PRIMARY CARE | Facility: CLINIC | Age: 59
End: 2024-01-24
Payer: COMMERCIAL

## 2024-01-24 DIAGNOSIS — R32 URINARY INCONTINENCE, UNSPECIFIED TYPE: ICD-10-CM

## 2024-01-24 PROCEDURE — 99213 OFFICE O/P EST LOW 20 MIN: CPT | Performed by: INTERNAL MEDICINE

## 2024-01-24 RX ORDER — MIRABEGRON 25 MG/1
25 TABLET, FILM COATED, EXTENDED RELEASE ORAL DAILY
Qty: 30 TABLET | Refills: 5 | Status: SHIPPED | OUTPATIENT
Start: 2024-01-24 | End: 2024-03-04 | Stop reason: ALTCHOICE

## 2024-01-24 ASSESSMENT — ENCOUNTER SYMPTOMS
OCCASIONAL FEELINGS OF UNSTEADINESS: 0
LOSS OF SENSATION IN FEET: 0
DEPRESSION: 0

## 2024-01-24 NOTE — PROGRESS NOTES
Subjective   Patient ID: Kallie Dale is a 58 y.o. female who presents for multiple medical issues.    Kallie Dale today came here for virtual visit.  She has abdominal distention, pain.  Every time she stands up, urine falls.  She cannot even get out of the house.  Gets pain.  She is concerned with that.  Weak, tired, fatigued, exhausted.  She came for follow-up.    I have personally reviewed the patient's Past Medical History, Medications, Allergies, Social History, and Family History in the EMR.    Review of Systems   All other systems reviewed and are negative.    Objective   Virtual exam.  There were no vitals taken for this visit.    Physical Exam  Musculoskeletal:      Right lower leg: No edema.      Left lower leg: No edema.     LAB WORK: Laboratory testing discussed.    Assessment/Plan   Problem List Items Addressed This Visit    None  Visit Diagnoses         Codes    Urinary incontinence, unspecified type     R32    Relevant Medications    mirabegron (Mybetriq) 25 mg tablet extended release 24 hr 24 hr tablet    Other Relevant Orders    Referral to Urology        1. Urinary incontinence.  We will try Myrbetriq.  2. Possible incontinence.  Needs surgical treatment.  Referred to Dr. Sadler.  3. ______, okay.  4. Continue to follow.    Scribe Attestation  By signing my name below, ICynthia Scribe attest that this documentation has been prepared under the direction and in the presence of Bertram Carrillo MD.

## 2024-02-08 ENCOUNTER — OFFICE VISIT (OUTPATIENT)
Dept: OBSTETRICS AND GYNECOLOGY | Facility: CLINIC | Age: 59
End: 2024-02-08
Payer: COMMERCIAL

## 2024-02-08 VITALS
DIASTOLIC BLOOD PRESSURE: 79 MMHG | SYSTOLIC BLOOD PRESSURE: 129 MMHG | HEIGHT: 64 IN | HEART RATE: 77 BPM | BODY MASS INDEX: 42.85 KG/M2 | WEIGHT: 251 LBS

## 2024-02-08 DIAGNOSIS — N90.89 LABIAL IRRITATION: ICD-10-CM

## 2024-02-08 DIAGNOSIS — R32 URINARY INCONTINENCE, UNSPECIFIED TYPE: Primary | ICD-10-CM

## 2024-02-08 LAB
POC APPEARANCE, URINE: CLEAR
POC BILIRUBIN, URINE: NEGATIVE
POC BLOOD, URINE: NEGATIVE
POC COLOR, URINE: YELLOW
POC GLUCOSE, URINE: NEGATIVE MG/DL
POC KETONES, URINE: NEGATIVE MG/DL
POC LEUKOCYTES, URINE: NEGATIVE
POC NITRITE,URINE: NEGATIVE
POC PH, URINE: 5.5 PH
POC PROTEIN, URINE: NEGATIVE MG/DL
POC SPECIFIC GRAVITY, URINE: >=1.03
POC UROBILINOGEN, URINE: 0.2 EU/DL

## 2024-02-08 PROCEDURE — 81003 URINALYSIS AUTO W/O SCOPE: CPT | Mod: QW | Performed by: NURSE PRACTITIONER

## 2024-02-08 PROCEDURE — 3008F BODY MASS INDEX DOCD: CPT | Performed by: NURSE PRACTITIONER

## 2024-02-08 PROCEDURE — 99203 OFFICE O/P NEW LOW 30 MIN: CPT | Performed by: NURSE PRACTITIONER

## 2024-02-08 PROCEDURE — 99213 OFFICE O/P EST LOW 20 MIN: CPT | Performed by: NURSE PRACTITIONER

## 2024-02-08 PROCEDURE — 3078F DIAST BP <80 MM HG: CPT | Performed by: NURSE PRACTITIONER

## 2024-02-08 PROCEDURE — 1036F TOBACCO NON-USER: CPT | Performed by: NURSE PRACTITIONER

## 2024-02-08 PROCEDURE — 3074F SYST BP LT 130 MM HG: CPT | Performed by: NURSE PRACTITIONER

## 2024-02-08 RX ORDER — CLOTRIMAZOLE AND BETAMETHASONE DIPROPIONATE 10; .64 MG/G; MG/G
1 CREAM TOPICAL 2 TIMES DAILY
Qty: 45 G | Refills: 2 | Status: SHIPPED | OUTPATIENT
Start: 2024-02-08 | End: 2025-02-07

## 2024-02-08 ASSESSMENT — PAIN SCALES - GENERAL: PAINLEVEL: 10-WORST PAIN EVER

## 2024-02-08 NOTE — PROGRESS NOTES
Referring Physician: Bertram Carrillo MD     General medical background: None    Obstetric/Gynecologic History:  Kallie Dale has a history of : 4. Para: 3. There is a history of  2  sections. The weight of the largest baby was not discussed. Patient is not currently sexually active.    Past Evaluation and Treatment::  Past evaluation includes: urology consultation urinalysis  Past treatment includes:  Medication      Urinary Incontinence Questions:  Looking back, how long do you think that you have been affected by your urinary complaints? Worsening symptoms after , but before    Have you seen a physician or medical provider for this?Yes  Have you ever been prescribed any medication for this? Yes  Have you ever taken any medication for this? Yes  How many different medications have you tried or been prescribed? 1  How much symptom relief do you, or did you, have taking these medications?None  If you stopped taking medication(s), what were the reasons why you stopped? (Check all that apply):  n/a  How frustrated are you with your bladder symptoms? Extremely frustrated  What are your goals of therapy? Goal 1: solve sx    Testing results:  PVR results are available and reviewed  : 0  UA results available and reviewed  Laboratory:   Urine dipstick shows negative.      History:  Stress Symptoms:   Does coughing gently cause you to lose urine? 1 - Rarely  Does coughing hard cause you to lose urine? 2 - Sometimes  Does sneezing cause you to lose urine? 2 - Sometimes  Does lifting things cause you to lose urine? 2 - Sometimes  Does bending cause you to lose urine? 1 - Rarely  Does laughing cause you to lose urine? 1 - Rarely  Does walking briskly or jogging cause you to lose urine? 1 - Rarely  Does straining, if you are constipated, cause you lose urine? 1 - Rarely  Does getting up from a sitting to a standing position cause you to lose urine? 3 - Often  Do you leak urine with  intercourse? 0 - Never  Score:  Urge Symptoms:   Some women receive very little warning and suddenly find that they are losing, or about to lose, urine beyond their control. How often does this happen to you? 0 - Never  If you can't find a toilet or find that the toilet is occupied when you have an urge to urinate, how often do you end up lossing urine or wetting yourself? 2 - Sometimes  Do you lose urine when you suddenly have the feeling that your bladder is very full? 0 - Never  Does washing your hands cause you to lose urine? 0 - Never  Does cold weather cause you to lose urine? 0 - Never  Does drinking cold beverages cause you to lose urine? 0 - Never  Do you have frequent bladder infections? 0 - Never  Do you have blood in your urine? 0 - Never  Do you feel like you don't empty your bladder completely? 0 - Never  How often do you urinate at night? 3 - Often  Score:  Vaginal Symptoms:   Do you experience pressure in the lower abdomen? 0 - Never  Do you experience heaviness or dullness in the pelvic area? 0 - Never  Do you have a bulge or something falling out that you can see or feel in the vaginal area? 0 - Never  Do you have to push up on the vagina or around the rectum to have or complete a bowel movement? 0 - Never  Do you have to push up on the vagina or around the rectum to start or complete urination? 0 - Never  Score:  Rectal Symptoms:   Do you need to strain to have a bowel movement? 0 - Never  Do you feel that you have not completely emptied your bowels at the end of a bowel movement? 0 - Never  Do you lose stool beyond your control if your stool is well formed? 0 - Never  Do you lose stool beyond your control if your stool is loose or liquid? 0 - Never  Do you lose gas from the rectum beyond your control? 0 - Never  Do you have pain when you pass your stool? 0 - Never  Do you experience a strong sense of urgency and have to rush to the bathroom to have a bowel movement? 0 - Never  Does part of your  bowel ever pass through the rectum and bulge to the bathroom to have a bowel movement? 0 - Never      Review of Systems   Constitutional: Negative.    HENT: Negative.     Respiratory: Negative.     Gastrointestinal: Negative.    Genitourinary: Negative.    Musculoskeletal: Negative.    Neurological: Negative.    Psychiatric/Behavioral: Negative.          HPI   The patient, Kallie, presents with a chief complaint of urinary incontinence, which began between Thanksgiving and Christmas. She reports that the incontinence is worse when lying down, causing her to wake up three to four times per night to change pajamas. The urge to urinate is more pronounced at nighttime or when lying down. aKllie also experiences urinary leakage when coughing and sneezing and wears pads day and night.    Kallie has noticed a bulge on the left side of her vagina, where the pubic hair starts. She has a history of C-sections but still has her uterus and ovaries. She is not currently sexually active and last had intercourse in November 2022. The patient denies consuming caffeine.    The patient reports swelling in her feet, which is atypical for her. The swelling occurs when she walks for extended periods due to difficulty walking because of her back. She has spinal stenosis and has gained weight as a result of limited mobility. Kallie is currently on the [employer] diet and has quit drinking soda.    Kallie experiences itching on the outside of the swollen area, resulting in scratches from her fingernails. The bulge and urinary leakage started around the same time. She also reports occasional pain in the lower pelvic region.  Physical Exam  Constitutional:       Appearance: Normal appearance.   Genitourinary:      Vulva, bladder, rectum and urethral meatus normal.      Genitourinary Comments: - Bulge on the left side of the patient's vagina, where the pubic hair starts  - Swollen feet  - Itching on the outside of the swollen area with scratches  from fingernails  - No pain during pelvic examination  - No prolapse observed  - Left labia swollen, possibly due to inflammation from pads or an inguinal hernia        No vaginal prolapse present.     No cervical lesion or polyp.      Uterus is not tender.   HENT:      Head: Normocephalic.      Mouth/Throat:      Mouth: Mucous membranes are moist.      Pharynx: Oropharynx is clear.   Eyes:      Extraocular Movements: Extraocular movements intact.      Conjunctiva/sclera: Conjunctivae normal.      Pupils: Pupils are equal, round, and reactive to light.   Cardiovascular:      Rate and Rhythm: Normal rate and regular rhythm.      Pulses: Normal pulses.      Heart sounds: Normal heart sounds.   Pulmonary:      Effort: Pulmonary effort is normal.      Breath sounds: Normal breath sounds.   Abdominal:      General: Abdomen is flat. Bowel sounds are normal.      Palpations: Abdomen is soft.   Musculoskeletal:      Cervical back: Normal range of motion.   Neurological:      General: No focal deficit present.      Mental Status: She is alert and oriented to person, place, and time.   Psychiatric:         Mood and Affect: Mood normal.         Behavior: Behavior normal.         Thought Content: Thought content normal.         Judgment: Judgment normal.          Assessment and Plan    No problem-specific Assessment & Plan notes found for this encounter.  Kallie Dale is a 58 y.o. female presenting for Urinary incontinence  Comorbidities include: HTN, HLD, DVT, GERD, Anxiety, Depression, Arthritis, Insomnia    Plan:  1. Urinary incontinence:     - Discontinue Mirabegron a couple of days prior to urodynamic testing.     - Schedule urodynamic testing to determine the cause of incontinence and appropriate treatment options.     - Refer to Dr. Bal at [facility] for further evaluation and potential treatment options, including Botox injections, bladder pacemaker, or surgical sling, depending on urodynamic results.    2. Skin  irritation and inflammation:     - Prescribe Mometasone cream to be applied twice daily to the affected area to reduce inflammation and itching.     - Educate the patient on proper hygiene and pad-changing practices to minimize skin irritation.    3. Possible inguinal hernia:     - Monitor the patient's symptoms and consider further imaging (e.g., CT scan) if the swelling worsens or becomes painful.     - Refer to a general surgeon for evaluation and potential hernia repair if necessary.    4. Spinal stenosis:     - Continue current management and monitor for any changes in symptoms or worsening of bladder issues potentially related to spinal stenosis.    5. Weight management:     - Encourage the patient to continue with the [employer] program and maintain a healthy lifestyle to help with weight loss and potentially improve mobility and bladder issues.    JOHN Garcia   By signing my name below, ISaray scribe attest that this documentation has been prepared under the direction and in the presence of JOHN Garcia  I, JOHN Garcia, personally performed the services described in this documentation which was scribed virtually and I confirm that it is both accurate and complete.

## 2024-02-12 ASSESSMENT — ENCOUNTER SYMPTOMS
MUSCULOSKELETAL NEGATIVE: 1
RESPIRATORY NEGATIVE: 1
CONSTITUTIONAL NEGATIVE: 1
PSYCHIATRIC NEGATIVE: 1
NEUROLOGICAL NEGATIVE: 1
GASTROINTESTINAL NEGATIVE: 1

## 2024-02-28 ENCOUNTER — PROCEDURE VISIT (OUTPATIENT)
Dept: OBSTETRICS AND GYNECOLOGY | Facility: CLINIC | Age: 59
End: 2024-02-28
Payer: COMMERCIAL

## 2024-02-28 DIAGNOSIS — N39.41 URGE URINARY INCONTINENCE: ICD-10-CM

## 2024-02-28 PROCEDURE — 51797 INTRAABDOMINAL PRESSURE TEST: CPT | Performed by: NURSE PRACTITIONER

## 2024-02-28 PROCEDURE — 51798 US URINE CAPACITY MEASURE: CPT | Performed by: NURSE PRACTITIONER

## 2024-02-28 PROCEDURE — 51729 CYSTOMETROGRAM W/VP&UP: CPT | Performed by: NURSE PRACTITIONER

## 2024-02-28 PROCEDURE — 51741 ELECTRO-UROFLOWMETRY FIRST: CPT | Performed by: NURSE PRACTITIONER

## 2024-02-28 PROCEDURE — 51784 ANAL/URINARY MUSCLE STUDY: CPT | Performed by: NURSE PRACTITIONER

## 2024-03-01 NOTE — PROGRESS NOTES
Patient ID: Kallie Dale is a 58 y.o. female.    Uroflowmetry    Date/Time: 2/28/2024 9:58 AM    Performed by: Lisa Simpson MA  Authorized by: JOHN Garcia    Procedure discussed: discussed risks, benefits and alternatives    Chaperone present: no    Timeout: timeout called immediately prior to procedure    Prep: patient was prepped and draped in usual sterile fashion    Position: sitting    Procedure Details     Procedure: CMG with UPP/voiding pressures, EMG, intra-abdominal voiding study and uroflow      CMG with UPP/Voiding Pressures Details:     First urge to void (mL): 164    Bladder capacity (mL): 249    Uroflow Details:     Pre-void volume (mL): 25.7    Voiding duration (sec): 25.3    Average flow rate (mL/sec): 2.9    Max flow rate (mL/sec): 7.5    Voided urine (mL): 207    Residual urine (mL): 42    Post-Procedure Details     Outcome: patient tolerated procedure well with no complications          DO with Leak upon filling; at 185 ml and at 249 ml  Normal closing pressure.    No urinary retention   Bell curve voiding pattern but is intermittent.   Voids with detrusor.     JOHN Garcia

## 2024-03-05 ENCOUNTER — TELEMEDICINE (OUTPATIENT)
Dept: OBSTETRICS AND GYNECOLOGY | Facility: CLINIC | Age: 59
End: 2024-03-05
Payer: COMMERCIAL

## 2024-03-05 DIAGNOSIS — N39.41 URGE URINARY INCONTINENCE: Primary | ICD-10-CM

## 2024-03-05 PROCEDURE — 99441 PR PHYS/QHP TELEPHONE EVALUATION 5-10 MIN: CPT | Performed by: NURSE PRACTITIONER

## 2024-03-05 PROCEDURE — 3008F BODY MASS INDEX DOCD: CPT | Performed by: NURSE PRACTITIONER

## 2024-03-05 PROCEDURE — 1036F TOBACCO NON-USER: CPT | Performed by: NURSE PRACTITIONER

## 2024-03-05 ASSESSMENT — ENCOUNTER SYMPTOMS
CARDIOVASCULAR NEGATIVE: 1
RESPIRATORY NEGATIVE: 1
ENDOCRINE NEGATIVE: 1
GASTROINTESTINAL NEGATIVE: 1
PSYCHIATRIC NEGATIVE: 1
NEUROLOGICAL NEGATIVE: 1
EYES NEGATIVE: 1
CONSTITUTIONAL NEGATIVE: 1
MUSCULOSKELETAL NEGATIVE: 1

## 2024-03-05 NOTE — PROGRESS NOTES
Subjective   Patient ID: Kallie Dale is a 58 y.o. female who presents in virtual follow up to review UDS results.     HPI  Records Review:   Uroflowmetry     Date/Time: 2/28/2024 9:58 AM     Performed by: Lisa Simpson MA  Authorized by: JOHN Garcia    Procedure discussed: discussed risks, benefits and alternatives    Chaperone present: no    Timeout: timeout called immediately prior to procedure    Prep: patient was prepped and draped in usual sterile fashion    Position: sitting     Procedure Details     Procedure: CMG with UPP/voiding pressures, EMG, intra-abdominal voiding study and uroflow       CMG with UPP/Voiding Pressures Details:     First urge to void (mL): 164    Bladder capacity (mL): 249     Uroflow Details:     Pre-void volume (mL): 25.7    Voiding duration (sec): 25.3    Average flow rate (mL/sec): 2.9    Max flow rate (mL/sec): 7.5    Voided urine (mL): 207    Residual urine (mL): 42     Post-Procedure Details     Outcome: patient tolerated procedure well with no complications             DO with Leak upon filling; at 185 ml and at 249 ml  Normal closing pressure.    No urinary retention   Bell curve voiding pattern but is intermittent.   Voids with detrusor.      JOHN Garcia        Urinary Symptoms:   - Previously failed Myrbetriq as she continues to endorse persistent UUI episodes with large volumes of urine.   - The patient is interested in pursuing intradetrusor Botox injections.     Vulvar Symptoms:  - She reports improvement in her vulvar irritation with using Clotrimazole/Betamethasone cream.       Review of Systems   Constitutional: Negative.    HENT: Negative.     Eyes: Negative.    Respiratory: Negative.     Cardiovascular: Negative.    Gastrointestinal: Negative.    Endocrine: Negative.    Musculoskeletal: Negative.    Neurological: Negative.    Psychiatric/Behavioral: Negative.         Assessment/Plan   58 year old with UUI/OAB. Comorbidities  include: HTN, hx of DVT, and GERD.    Diagnoses:  #1 Urge incontinence   #2 Overactive bladder    Plan:  1. OAB, UUI  - Previously failed Myrbetriq as she continues to endorse persistent UUI episodes with large volumes of urine.   - We reviewed her UDS results from 2/28/2024 which demonstrated DO with leak upon filling at 185mL and 249mL, normal MUCPs, no urinary retention, bell curve voiding patter but is intermittent, and voids via detrusor mechanism.   - She has a small bladder capacity at 249mL whereas a normal bladder capacity ranges between 300-600mL.    - We discussed the etiology of overactive bladder with the neurological aging process. We reviewed that OAB symptoms occur when the detrusor muscle contracts/squeezes allowing the feeling of urgency and frequency prior to the bladder actually being full enough to void (I.e. inappropriate bladder spasms).   - The patient is now interested in pursuing intradetrusor Botox injections with cystoscopy. We counseled that the benefits of Botox include not having to take daily medications which can have systemic side effects and that Botox can be received every few months to help manage OAB symptoms. However, the effects of Botox begin to wear off with time and she will need reinjection of 100 U every 3-9 months to continue managing OAB symptoms and as Botox begins to wear off she may notice a return of her urinary sxs.     Follow up with Dr. Alma Bal @ [facility] on 3/12/2024 to discuss intradetrusor Botox injections.     Virtual visit today: The patient's identity was confirmed and that she is located in [place].   JOHN Garcia identified herself and the patient consented to a telehealth visit today. Telehealth visit time:  7 minutes    Scribe Attestation  By signing my name below, IJose Scribe, attest that this documentation has been prepared under the direction and in the presence of JOHN Garcia on 03/05/2024 at 5:42 PM.     I, TERRENCE Garcia-FER, personally performed the services described in this documentation which was scribed virtually and I confirm that it is both accurate and complete.

## 2024-03-12 ENCOUNTER — APPOINTMENT (OUTPATIENT)
Dept: OBSTETRICS AND GYNECOLOGY | Facility: CLINIC | Age: 59
End: 2024-03-12
Payer: COMMERCIAL

## 2024-03-19 ENCOUNTER — APPOINTMENT (OUTPATIENT)
Dept: OBSTETRICS AND GYNECOLOGY | Facility: CLINIC | Age: 59
End: 2024-03-19
Payer: COMMERCIAL

## 2024-04-09 ENCOUNTER — OFFICE VISIT (OUTPATIENT)
Dept: OBSTETRICS AND GYNECOLOGY | Facility: CLINIC | Age: 59
End: 2024-04-09
Payer: COMMERCIAL

## 2024-04-09 VITALS
WEIGHT: 254 LBS | HEIGHT: 64 IN | BODY MASS INDEX: 43.36 KG/M2 | SYSTOLIC BLOOD PRESSURE: 152 MMHG | DIASTOLIC BLOOD PRESSURE: 80 MMHG

## 2024-04-09 DIAGNOSIS — N39.41 URGE URINARY INCONTINENCE: ICD-10-CM

## 2024-04-09 DIAGNOSIS — N32.81 OAB (OVERACTIVE BLADDER): Primary | ICD-10-CM

## 2024-04-09 PROCEDURE — 3079F DIAST BP 80-89 MM HG: CPT | Performed by: OBSTETRICS & GYNECOLOGY

## 2024-04-09 PROCEDURE — 3077F SYST BP >= 140 MM HG: CPT | Performed by: OBSTETRICS & GYNECOLOGY

## 2024-04-09 PROCEDURE — 3008F BODY MASS INDEX DOCD: CPT | Performed by: OBSTETRICS & GYNECOLOGY

## 2024-04-09 PROCEDURE — 99214 OFFICE O/P EST MOD 30 MIN: CPT | Performed by: OBSTETRICS & GYNECOLOGY

## 2024-04-10 NOTE — PROGRESS NOTES
[facility] Department of Urogynecology   Alma Bal MD, MPH   [phone]    ASSESSMENT AND PLAN:   58 y.o. female with UUI/OAB. Comorbidities include: HTN, hx of DVT, and GERD.     Diagnoses:  #1 Urge incontinence     #2 Overactive bladder    Plan:    1. OAB, UUI   - Previously failed Myrbetriq as she continued to endorse persistent UUI episodes with large volumes of urine.   - Botox on average lasts every 6 months. Because bladder capacity was low on UDS (249 mL), she is a good candidate for Botox. There is 5% risk of retention with Botox and tx could involve medication or learning to s/c. Other Botox risks are infection and pink tinge to urine. She will be given an antibiotic the day of her Botox injections. The other option is SNM. It involves a trial period where patient must have at least 50% sx improvement from her baseline bladder diary. If SNM worked, she could receive the final implant and the battery lasts 15 years.   - Patient wants to move forward with Botox for OAB. Depending on insurance, we may need to do a prior auth.      Follow-up in 2 weeks @ [facility] with Dr. Bal for Botox + cysto.     Scribe Attestation:   I, Nery Kumar, am scribing for virtually, and in the presence of Alma Bal MD MPH on 4/10/24 at 5:34 PM.     Problem List Items Addressed This Visit    None     I spent a total of 30 minutes in face to face and non face to face time.      Alma Bal MD, MPH, FACOG     I Dr. Bal, personally performed the services described in the documentation as scribed in my presence and confirm it is both complete and accurate.  Alma Bal MD, MPH, FACOG      Established    HISTORY OF PRESENT ILLNESS:     Kallie Dale is a 58 y.o. female who presents for Botox consult.     Record Review:   - 3/5/24 Pooja OCAMPO-CNP: OAB - UDS results from 2/28/2024 which demonstrated DO with leak upon filling at 185mL. Interested in Botox.      Urinary Symptoms:   -  "Has worn pads constantly since last .   - Gets up 2x per night changing pajamas even with pads.   - She failed Myrbetriq.   - Interested in Botox.        Past Medical History:     - she has spinal stenosis   - not on blood thinners   - She is on Ozempic, Omeprazole, and Lexapro.   Past Medical History:   Diagnosis Date    Encounter for screening, unspecified     Visit for screening          Past Surgical History:     Past Surgical History:   Procedure Laterality Date     SECTION, CLASSIC  2013     Section     SECTION, CLASSIC  06/10/2013     Section    KNEE SURGERY  2013    Knee Surgery    KNEE SURGERY  06/10/2013    Knee Surgery    TONSILLECTOMY  2013    Tonsillectomy    TONSILLECTOMY  06/10/2013    Tonsillectomy         Medications:     Prior to Admission medications    Medication Sig Start Date End Date Taking? Authorizing Provider   clotrimazole-betamethasone (Lotrisone) cream Apply 1 Application topically 2 times a day. 24  TERRENCE Garcia-CNP   escitalopram (Lexapro) 20 mg tablet TAKE 1 TABLET BY MOUTH EVERY DAY 3/10/24   Bertram Carrillo MD   metoprolol succinate XL (Toprol-XL) 50 mg 24 hr tablet TAKE 1 TABLET BY MOUTH EVERY DAY 23  Bertram Carrillo MD   omeprazole (PriLOSEC) 40 mg DR capsule TAKE 1 CAPSULE BY MOUTH EVERY DAY 7/10/23   Bertram Carrillo MD   valACYclovir (Valtrex) 500 mg tablet TAKE 1 TABLET BY MOUTH EVERY DAY 23   Bertram Carrillo MD        ROS  Review of Systems       PHYSICAL EXAM:    /80   Ht 1.626 m (5' 4\")   Wt 115 kg (254 lb)   BMI 43.60 kg/m²   No LMP recorded. Patient is postmenopausal.    Declines chaperone for physical exam.      Well developed, well nourished, in no apparent distress.   Neurologic/Psychiatric:  Awake, Alert and Oriented times 3.  Affect normal.       Data and DIAGNOSTIC STUDIES REVIEWED   No results found.   No results found for: \"URINECULTURE\", \"UAMICCOMM\" "   Lab Results   Component Value Date    GLUCOSE 96 01/08/2024    CALCIUM 9.7 01/08/2024     01/08/2024    K 4.8 01/08/2024    CO2 26 01/08/2024     01/08/2024    BUN 10 01/08/2024    CREATININE 0.81 01/08/2024     Lab Results   Component Value Date    WBC 6.9 01/08/2024    HGB 13.5 01/08/2024    HCT 41.8 01/08/2024    MCV 97 01/08/2024     01/08/2024

## 2024-04-25 ENCOUNTER — PROCEDURE VISIT (OUTPATIENT)
Dept: OBSTETRICS AND GYNECOLOGY | Facility: CLINIC | Age: 59
End: 2024-04-25
Payer: COMMERCIAL

## 2024-04-25 VITALS
DIASTOLIC BLOOD PRESSURE: 83 MMHG | SYSTOLIC BLOOD PRESSURE: 138 MMHG | HEART RATE: 84 BPM | BODY MASS INDEX: 41.32 KG/M2 | HEIGHT: 64 IN | WEIGHT: 242 LBS

## 2024-04-25 DIAGNOSIS — N39.41 URGE URINARY INCONTINENCE: ICD-10-CM

## 2024-04-25 LAB
POC APPEARANCE, URINE: CLEAR
POC BILIRUBIN, URINE: NEGATIVE
POC BLOOD, URINE: NEGATIVE
POC COLOR, URINE: YELLOW
POC GLUCOSE, URINE: NEGATIVE MG/DL
POC KETONES, URINE: NEGATIVE MG/DL
POC LEUKOCYTES, URINE: NEGATIVE
POC NITRITE,URINE: NEGATIVE
POC PH, URINE: 6 PH
POC PROTEIN, URINE: ABNORMAL MG/DL
POC SPECIFIC GRAVITY, URINE: >=1.03
POC UROBILINOGEN, URINE: 0.2 EU/DL

## 2024-04-25 PROCEDURE — 2500000005 HC RX 250 GENERAL PHARMACY W/O HCPCS: Performed by: OBSTETRICS & GYNECOLOGY

## 2024-04-25 PROCEDURE — A4216 STERILE WATER/SALINE, 10 ML: HCPCS | Performed by: OBSTETRICS & GYNECOLOGY

## 2024-04-25 PROCEDURE — 52287 CYSTOSCOPY CHEMODENERVATION: CPT | Performed by: OBSTETRICS & GYNECOLOGY

## 2024-04-25 PROCEDURE — 2500000006 HC RX 250 W HCPCS SELF ADMINISTERED DRUGS (ALT 637 FOR ALL PAYERS): Performed by: OBSTETRICS & GYNECOLOGY

## 2024-04-25 PROCEDURE — 81003 URINALYSIS AUTO W/O SCOPE: CPT | Performed by: OBSTETRICS & GYNECOLOGY

## 2024-04-25 PROCEDURE — 2500000004 HC RX 250 GENERAL PHARMACY W/ HCPCS (ALT 636 FOR OP/ED): Performed by: OBSTETRICS & GYNECOLOGY

## 2024-04-25 PROCEDURE — 96372 THER/PROPH/DIAG INJ SC/IM: CPT | Performed by: OBSTETRICS & GYNECOLOGY

## 2024-04-25 RX ORDER — NITROFURANTOIN 25; 75 MG/1; MG/1
100 CAPSULE ORAL ONCE
Status: COMPLETED | OUTPATIENT
Start: 2024-04-25 | End: 2024-04-25

## 2024-04-25 RX ORDER — SODIUM CHLORIDE 9 MG/ML
10 INJECTION, SOLUTION INTRAMUSCULAR; INTRAVENOUS; SUBCUTANEOUS ONCE
Status: COMPLETED | OUTPATIENT
Start: 2024-04-25 | End: 2024-04-25

## 2024-04-25 RX ORDER — LIDOCAINE HYDROCHLORIDE 10 MG/ML
5 INJECTION INFILTRATION; PERINEURAL ONCE
Status: COMPLETED | OUTPATIENT
Start: 2024-04-25 | End: 2024-04-25

## 2024-04-25 RX ORDER — PHENAZOPYRIDINE HYDROCHLORIDE 200 MG/1
200 TABLET, FILM COATED ORAL ONCE
Status: COMPLETED | OUTPATIENT
Start: 2024-04-25 | End: 2024-04-25

## 2024-04-25 RX ORDER — LIDOCAINE HYDROCHLORIDE 20 MG/ML
1 JELLY TOPICAL ONCE
Status: COMPLETED | OUTPATIENT
Start: 2024-04-25 | End: 2024-04-25

## 2024-04-25 RX ADMIN — LIDOCAINE HYDROCHLORIDE 1 APPLICATION: 20 JELLY TOPICAL at 13:55

## 2024-04-25 RX ADMIN — SODIUM CHLORIDE 10 ML: 9 INJECTION, SOLUTION INTRAMUSCULAR; INTRAVENOUS; SUBCUTANEOUS at 13:55

## 2024-04-25 RX ADMIN — ONABOTULINUMTOXINA 100 UNITS: 100 INJECTION, POWDER, LYOPHILIZED, FOR SOLUTION INTRADERMAL; INTRAMUSCULAR at 14:20

## 2024-04-25 RX ADMIN — LIDOCAINE HYDROCHLORIDE 50 MG: 10 INJECTION, SOLUTION INFILTRATION; PERINEURAL at 13:55

## 2024-04-25 RX ADMIN — NITROFURANTOIN (MONOHYDRATE/MACROCRYSTALS) 100 MG: 75; 25 CAPSULE ORAL at 13:45

## 2024-04-25 RX ADMIN — BISMUTH SUBSALICYLATE 200 MG: 262 TABLET, CHEWABLE ORAL at 13:45

## 2024-04-25 ASSESSMENT — ENCOUNTER SYMPTOMS
MUSCULOSKELETAL NEGATIVE: 1
NEUROLOGICAL NEGATIVE: 1
CONSTITUTIONAL NEGATIVE: 1
EYES NEGATIVE: 1
RESPIRATORY NEGATIVE: 1
ENDOCRINE NEGATIVE: 1
GASTROINTESTINAL NEGATIVE: 1
CARDIOVASCULAR NEGATIVE: 1
PSYCHIATRIC NEGATIVE: 1

## 2024-04-25 ASSESSMENT — PAIN SCALES - GENERAL: PAINLEVEL: 0-NO PAIN

## 2024-04-25 NOTE — PROGRESS NOTES
[facility] Department of Urogynecology   Alma Bal MD, MPH   [phone]    ASSESSMENT AND PLAN:   58 year old female with UUI/OAB. Comorbidities include: HTN, hx of DVT, and GERD.     Diagnoses:  #1 Urge incontinence     #2 Overactive bladder     Plan:    1. OAB, UUI   - POCT UA with trace protein but negative for nitrites/bacteria.   - Previously failed Myrbetriq as she continued to endorse persistent UUI episodes with large volumes of urine and is interested in pursuing intravesical Botox today.   - We discussed the risks of intradetrusor Botox and cystoscopy today including the scope scraping the urethra which might cause burning/irritation, hematuria, and UTI. We will give her a one-time dose of Macrobid today for UTI ppx and Pyridium to help prevent irritative symptoms from the cystoscope. There is around a 5% risk of urinary retention with symptoms of retention including strangury and urinary frequency with small volume urine voids however we will have her return to clinic in one week for a nurse visit to check her PVR. There is a risk of having an unknown allergy to Botox and having allergic reaction. There is a theoretical low risk of developing Botulism side effects which symptoms would include weakness in the muscles of the arms, legs, or trouble breathing and if she were to experience this she should go to the emergency room. Botox may take up to 7-10 days to reach full effect and the effects of Botox may provide her with OAB symptom relief for around 6 months. She verbalized her understanding of these risks and signed the informed consent today in the [employer] system.    - Cystoscopy + intradetrusor Botox injection of 100 U @ 4 sites today. Tolerated the procedure well with normal cystoscopy findings that demonstrated a normal bladder lining and mucosa, normal bladder neck upon retroflexion, normal urethra without any masses, and orthotopic ureters. No evidence of bladder polyps, stones, or  masses were visualized.   - We gave her a one time dose of Macrobid 100mg po today for UTI ppx before the procedure.     RTC in one week for a nurse visit PVR check after Botox.     Scribe Attestation  By signing my name below, I, Jose Osborne, Nettieibe, attest that this documentation has been prepared under the direction and in the presence of Alma Bal MD MPH on 04/25/2024 at 3:04 PM.     Problem List Items Addressed This Visit    None  Visit Diagnoses       Urge urinary incontinence        Relevant Medications    lidocaine (Xylocaine) 10 mg/mL (1 %) injection 50 mg    lidocaine 2 % mucosal jelly (Uro-Jet) 1 Application    nitrofurantoin (macrocrystal-monohydrate) (Macrobid) capsule 100 mg    sodium chloride (PF) 0.9% solution 10 mL    phenazopyridine (Pyridium) tablet 200 mg    onabotulinumtoxinA (Botox) injection 100 Units    Other Relevant Orders    POCT UA Automated manually resulted (Completed)           I Dr. Bal, personally performed the services described in the documentation as scribed in my presence and confirm it is both complete and accurate.  Alma Bal MD, MPH, FACOG      Established    HISTORY OF PRESENT ILLNESS:   58 year old female presenting for cystoscopy + intradetrusor Botox injection for UUI management.     Record Review:   - 4/10/2024 Dr. Alma Bal note RE: OAB/UUI - Failed Myrbetriq in the past. We counseled her on R/B/A to Botox including PTNS and SNM. However, she wishes to pursue intradetrusor Botox. She endorses nocturia 2x/night and changes pajamas even with wearing pads.   - 3/5/2024 Pooja Sandhu APRN-CNP: OAB - UDS results from 2/28/2024 which demonstrated DO with leak upon filling at 185mL. Interested in Botox.      Urinary Symptoms:   - She endorses nocturia 2x/night and changes pajamas even with wearing pads.   - The patient has previously failed Myrbetriq and wishes to try Botox to help reduce UUI and nocturia.        Past Medical History:     Past Medical  "History:   Diagnosis Date    Encounter for screening, unspecified     Visit for screening          Past Surgical History:     Past Surgical History:   Procedure Laterality Date     SECTION, CLASSIC  2013     Section     SECTION, CLASSIC  06/10/2013     Section    KNEE SURGERY  2013    Knee Surgery    KNEE SURGERY  06/10/2013    Knee Surgery    TONSILLECTOMY  2013    Tonsillectomy    TONSILLECTOMY  06/10/2013    Tonsillectomy         Medications:     Prior to Admission medications    Medication Sig Start Date End Date Taking? Authorizing Provider   clotrimazole-betamethasone (Lotrisone) cream Apply 1 Application topically 2 times a day. 24  TERRENCE Garcia-CNP   escitalopram (Lexapro) 20 mg tablet TAKE 1 TABLET BY MOUTH EVERY DAY 3/10/24   Bertram Carrillo MD   metoprolol succinate XL (Toprol-XL) 50 mg 24 hr tablet TAKE 1 TABLET BY MOUTH EVERY DAY 23  Bertram Carrillo MD   omeprazole (PriLOSEC) 40 mg DR capsule TAKE 1 CAPSULE BY MOUTH EVERY DAY 7/10/23   Bertram Carrillo MD   valACYclovir (Valtrex) 500 mg tablet TAKE 1 TABLET BY MOUTH EVERY DAY 23   Bertram Carrillo MD        ROS  Review of Systems   Constitutional: Negative.    HENT: Negative.     Eyes: Negative.    Respiratory: Negative.     Cardiovascular: Negative.    Gastrointestinal: Negative.    Endocrine: Negative.    Genitourinary:  Positive for enuresis and urgency.   Musculoskeletal: Negative.    Neurological: Negative.    Psychiatric/Behavioral: Negative.            PHYSICAL EXAM:    /83   Pulse 84   Ht 1.626 m (5' 4\")   Wt 110 kg (242 lb)   BMI 41.54 kg/m²   No LMP recorded. Patient is postmenopausal.    Declines chaperone for physical exam.      Well developed, well nourished, in no apparent distress.   Neurologic/Psychiatric:  Awake, Alert and Oriented times 3.  Affect normal.     GENITAL/URINARY:     External Genitalia:  The patient has normal appearing " external genitalia, normal skenes and bartholins glands, and a normal hair distribution.  Her vulva is without lesions, erythema or discharge.  It is non-tender with appropriate sensation.     Urethral Meatus:  Size normal, Location normal, Lesions absent, Prolapse absent.    Urethra:  Fullness absent, Masses absent.    Bladder:  Fullness absent, Masses absent, Tenderness absent.    Procedure note:   DATE OF PROCEDURE: 4/25/2024    SURGEON: Alma Bal M.D.      PREOPERATIVE DIAGNOSIS: Urge urinary incontinence N39.41     POSTOPERATIVE DIAGNOSIS: Urge urinary incontinence N39.41     PROCEDURE:  Intravesical botox injection - 100 units ([id number])      ANESTHESIA: Topical to the bladder      EBL: 1 mL    URINE OUTPUT: Not recorded    SPECIMEN: None     FLUIDS: N/A     COMPLICATIONS:  None    PROCEDURE INDICATIONS:  Ms. Kallie Dale presented to clinic for her first botox injection.  She was aware of the risks benefits and alternatives and she wished to proceed.     FINDINGS:   Normal appearing bladder, single left ureteral oriface. Normal bladder lining and mucosa, normal bladder neck upon retroflexion, normal urethra without any masses, and orthotopic ureters. No evidence of bladder polyps, stones, lesions, or masses were visualized.       PROCEDURE:    The patient entered the procedure room and signed written consent through the [employer] system.     We drained her bladder then instilled a viscous lidocaine with marcaine solution.  This remained in her bladder for about 20 minutes.  We then diluted 100 units of botox in 10 mL of injectible saline per 's instructions.     Then using a flexible Ambu scope, we visualized the bladder, and placed 5injections along the posterior bladder wall, at a total of 5 injection sites at a 4mm depth using a Laborie needle with a fifth site flush of NS. The bladder was then drained.     She was given a one time dose of Nitrofurantoin 100mg po today for UTI prophylaxis  before the procedure.      Additional Procedure Details:   Lot # [id number]  Expiration Date: 06/26    Data and DIAGNOSTIC STUDIES REVIEWED   Lab Results   Component Value Date    GLUCOSE 96 01/08/2024    CALCIUM 9.7 01/08/2024     01/08/2024    K 4.8 01/08/2024    CO2 26 01/08/2024     01/08/2024    BUN 10 01/08/2024    CREATININE 0.81 01/08/2024     Lab Results   Component Value Date    WBC 6.9 01/08/2024    HGB 13.5 01/08/2024    HCT 41.8 01/08/2024    MCV 97 01/08/2024     01/08/2024

## 2024-04-26 ENCOUNTER — APPOINTMENT (OUTPATIENT)
Dept: OBSTETRICS AND GYNECOLOGY | Facility: CLINIC | Age: 59
End: 2024-04-26
Payer: COMMERCIAL

## 2024-07-10 DIAGNOSIS — N90.89 LABIAL IRRITATION: ICD-10-CM

## 2024-07-10 DIAGNOSIS — F41.9 ANXIETY: ICD-10-CM

## 2024-07-10 DIAGNOSIS — N94.9 VAGINAL BURNING: ICD-10-CM

## 2024-07-11 RX ORDER — CLOTRIMAZOLE AND BETAMETHASONE DIPROPIONATE 10; .64 MG/G; MG/G
CREAM TOPICAL 2 TIMES DAILY
Qty: 45 G | Refills: 2 | Status: SHIPPED | OUTPATIENT
Start: 2024-07-11

## 2024-07-11 RX ORDER — ESCITALOPRAM OXALATE 20 MG/1
20 TABLET ORAL DAILY
Qty: 30 TABLET | Refills: 2 | OUTPATIENT
Start: 2024-07-11

## 2024-07-11 RX ORDER — VALACYCLOVIR HYDROCHLORIDE 500 MG/1
500 TABLET, FILM COATED ORAL DAILY
Qty: 90 TABLET | Refills: 1 | OUTPATIENT
Start: 2024-07-11

## 2024-07-11 NOTE — TELEPHONE ENCOUNTER
Request received for   Requested Prescriptions     Pending Prescriptions Disp Refills    clotrimazole-betamethasone (Lotrisone) cream [Pharmacy Med Name: CLOTRIMAZOLE/BETAMETH CREAM] 45 g 2     Sig: APPLY TO AFFECTED AREA TWICE A DAY       Kallie Dale was last seen 4/25/24 and has an appt for Visit date not found.

## 2024-11-05 ENCOUNTER — OFFICE VISIT (OUTPATIENT)
Dept: PRIMARY CARE | Facility: CLINIC | Age: 59
End: 2024-11-05
Payer: COMMERCIAL

## 2024-11-05 ENCOUNTER — HOSPITAL ENCOUNTER (OUTPATIENT)
Dept: RADIOLOGY | Facility: CLINIC | Age: 59
Discharge: HOME | End: 2024-11-05
Payer: COMMERCIAL

## 2024-11-05 VITALS
HEIGHT: 64 IN | BODY MASS INDEX: 41.32 KG/M2 | WEIGHT: 242 LBS | DIASTOLIC BLOOD PRESSURE: 70 MMHG | SYSTOLIC BLOOD PRESSURE: 134 MMHG

## 2024-11-05 DIAGNOSIS — I10 HYPERTENSION, UNSPECIFIED TYPE: ICD-10-CM

## 2024-11-05 DIAGNOSIS — E78.00 HIGH CHOLESTEROL: ICD-10-CM

## 2024-11-05 DIAGNOSIS — I10 PRIMARY HYPERTENSION: ICD-10-CM

## 2024-11-05 DIAGNOSIS — F41.9 ANXIETY: ICD-10-CM

## 2024-11-05 DIAGNOSIS — M54.2 NECK PAIN: ICD-10-CM

## 2024-11-05 DIAGNOSIS — M54.9 BACK PAIN, UNSPECIFIED BACK LOCATION, UNSPECIFIED BACK PAIN LATERALITY, UNSPECIFIED CHRONICITY: ICD-10-CM

## 2024-11-05 DIAGNOSIS — E03.9 HYPOTHYROIDISM, UNSPECIFIED TYPE: ICD-10-CM

## 2024-11-05 DIAGNOSIS — L21.9 SEBORRHEIC DERMATITIS, UNSPECIFIED: ICD-10-CM

## 2024-11-05 DIAGNOSIS — E78.5 HYPERLIPIDEMIA, UNSPECIFIED HYPERLIPIDEMIA TYPE: ICD-10-CM

## 2024-11-05 DIAGNOSIS — R63.5 WEIGHT GAIN: Primary | ICD-10-CM

## 2024-11-05 DIAGNOSIS — R73.03 PRE-DIABETES: ICD-10-CM

## 2024-11-05 PROCEDURE — 3078F DIAST BP <80 MM HG: CPT | Performed by: INTERNAL MEDICINE

## 2024-11-05 PROCEDURE — 72050 X-RAY EXAM NECK SPINE 4/5VWS: CPT | Performed by: RADIOLOGY

## 2024-11-05 PROCEDURE — 3075F SYST BP GE 130 - 139MM HG: CPT | Performed by: INTERNAL MEDICINE

## 2024-11-05 PROCEDURE — 72050 X-RAY EXAM NECK SPINE 4/5VWS: CPT

## 2024-11-05 PROCEDURE — 99214 OFFICE O/P EST MOD 30 MIN: CPT | Performed by: INTERNAL MEDICINE

## 2024-11-05 PROCEDURE — 3008F BODY MASS INDEX DOCD: CPT | Performed by: INTERNAL MEDICINE

## 2024-11-05 RX ORDER — NABUMETONE 750 MG/1
750 TABLET, FILM COATED ORAL 2 TIMES DAILY
Qty: 60 TABLET | Refills: 2 | Status: SHIPPED | OUTPATIENT
Start: 2024-11-05 | End: 2025-11-05

## 2024-11-05 RX ORDER — METOPROLOL SUCCINATE 50 MG/1
50 TABLET, EXTENDED RELEASE ORAL DAILY
Qty: 30 TABLET | Refills: 5 | Status: SHIPPED | OUTPATIENT
Start: 2024-11-05 | End: 2025-05-04

## 2024-11-05 RX ORDER — CYCLOBENZAPRINE HCL 10 MG
10 TABLET ORAL NIGHTLY PRN
Qty: 30 TABLET | Refills: 2 | Status: SHIPPED | OUTPATIENT
Start: 2024-11-05 | End: 2025-07-03

## 2024-11-05 RX ORDER — ESCITALOPRAM OXALATE 20 MG/1
20 TABLET ORAL DAILY
Qty: 90 TABLET | Refills: 0 | Status: SHIPPED | OUTPATIENT
Start: 2024-11-05

## 2024-11-05 RX ORDER — FLUOCINOLONE ACETONIDE 0.11 MG/ML
OIL TOPICAL 3 TIMES DAILY
Qty: 118 ML | Refills: 0 | Status: SHIPPED | OUTPATIENT
Start: 2024-11-05 | End: 2025-11-05

## 2024-11-06 NOTE — PROGRESS NOTES
"Subjective   Patient ID: Kallie Dale is a 59 y.o. female who presents for Follow-up and Results.    Kallie Dale today came here for multiple medical issues.  1. She has severe low back pain, both legs feels like jello, they are popping.  She might need a surgery.  Severe pain in back.  Only time she gets relief when she puts her legs upside down by the wall.  She is concerned.  2. Severe pain in neck going into left arm, tingling and numbness.  3. Follow-up on other conditions.  She has gained lot of weight because she cannot exercise at all.  4. She is seeing Dr. Hodges, Pain Management.  He wants MRI.  He thinks ______ surgery.    I have personally reviewed the patient's Past Medical History, Medications, Allergies, Social History, and Family History in the EMR.    CURRENT MEDICATIONS: She ran out of her metoprolol and Lexapro.    Review of Systems   All other systems reviewed and are negative.    Objective   /70   Ht 1.626 m (5' 4\")   Wt 110 kg (242 lb)   BMI 41.54 kg/m²     Physical Exam  Vitals reviewed.   Neck:      Comments: Diffuse tenderness.   Cardiovascular:      Heart sounds: Normal heart sounds, S1 normal and S2 normal. No murmur heard.     No friction rub.   Pulmonary:      Effort: Pulmonary effort is normal.      Breath sounds: Normal breath sounds and air entry.   Abdominal:      Palpations: There is no hepatomegaly, splenomegaly or mass.   Musculoskeletal:      Cervical back: Pain with movement present.      Right lower leg: No edema.      Left lower leg: No edema.      Comments: BACK: Diffuse tenderness.  Straight leg limited.   Lymphadenopathy:      Lower Body: No right inguinal adenopathy. No left inguinal adenopathy.   Neurological:      Cranial Nerves: Cranial nerves 2-12 are intact.      Sensory: No sensory deficit.      Motor: Motor function is intact.      Deep Tendon Reflexes: Reflexes are normal and symmetric.     LAB WORK: Laboratory testing discussed.    Assessment/Plan "   Problem List Items Addressed This Visit             ICD-10-CM       Cardiac and Vasculature    Hypertension I10    Relevant Medications    metoprolol succinate XL (Toprol-XL) 50 mg 24 hr tablet    Other Relevant Orders    CBC    Thyroid Stimulating Hormone    Urinalysis with Reflex Microscopic    Cortisol    Hyperlipidemia E78.5    Relevant Orders    Comprehensive Metabolic Panel    Lipid Panel       Mental Health    Anxiety F41.9    Relevant Medications    escitalopram (Lexapro) 20 mg tablet       Skin    Seborrheic dermatitis, unspecified L21.9    Relevant Medications    fluocinolone (Derma-Smoothe/FS Body Oil) 0.01 % external oil     Other Visit Diagnoses         Codes    Weight gain    -  Primary R63.5    Neck pain     M54.2    Relevant Medications    nabumetone (Relafen) 750 mg tablet    cyclobenzaprine (Flexeril) 10 mg tablet    Other Relevant Orders    Referral to Neurosurgery    Referral to Physical Therapy    Back pain, unspecified back location, unspecified back pain laterality, unspecified chronicity     M54.9    Relevant Medications    nabumetone (Relafen) 750 mg tablet    cyclobenzaprine (Flexeril) 10 mg tablet    Other Relevant Orders    MR lumbar spine wo IV contrast    Referral to Neurosurgery    Referral to Physical Therapy    Pre-diabetes     R73.03    Relevant Orders    Hemoglobin A1C    High cholesterol     E78.00    Hypothyroidism, unspecified type     E03.9        1. Back pain with lumbar radiculopathy, probably lumbar spinal stenosis is getting worse.  MRI ordered.  Referred to Dr. Hodges.  She might need Neurosurgery opinion after MRI.  Referred to Neurosurgery, Dr. Hickey.  2. Cervical spondylosis, neck pain going into left arm.  X-ray ______.  Relafen, Flexeril, physical therapy.  3. Weight gain.  She took semaglutide 2.4 mg, not losing any weight.  I will check serum cortisol level and blood work ordered.  4. Hypertension, okay.  5. High cholesterol, stable.  6. Thyroid.  Monitor.  7. I  shall see her back in a week after all the tests.  8. Continue to follow.    Scribe Attestation  By signing my name below, I, Juan Piña attest that this documentation has been prepared under the direction and in the presence of Bertram Carrillo MD.

## 2024-11-07 ENCOUNTER — LAB (OUTPATIENT)
Dept: LAB | Facility: LAB | Age: 59
End: 2024-11-07
Payer: COMMERCIAL

## 2024-11-07 DIAGNOSIS — I10 HYPERTENSION, UNSPECIFIED TYPE: ICD-10-CM

## 2024-11-07 DIAGNOSIS — E78.5 HYPERLIPIDEMIA, UNSPECIFIED HYPERLIPIDEMIA TYPE: ICD-10-CM

## 2024-11-07 DIAGNOSIS — R73.03 PRE-DIABETES: ICD-10-CM

## 2024-11-07 LAB
ALBUMIN SERPL BCP-MCNC: 4.4 G/DL (ref 3.4–5)
ALP SERPL-CCNC: 77 U/L (ref 33–110)
ALT SERPL W P-5'-P-CCNC: 22 U/L (ref 7–45)
ANION GAP SERPL CALC-SCNC: 15 MMOL/L (ref 10–20)
APPEARANCE UR: CLEAR
AST SERPL W P-5'-P-CCNC: 18 U/L (ref 9–39)
BILIRUB SERPL-MCNC: 0.6 MG/DL (ref 0–1.2)
BILIRUB UR STRIP.AUTO-MCNC: NEGATIVE MG/DL
BUN SERPL-MCNC: 12 MG/DL (ref 6–23)
CALCIUM SERPL-MCNC: 9.4 MG/DL (ref 8.6–10.6)
CHLORIDE SERPL-SCNC: 102 MMOL/L (ref 98–107)
CHOLEST SERPL-MCNC: 175 MG/DL (ref 0–199)
CHOLESTEROL/HDL RATIO: 3
CO2 SERPL-SCNC: 26 MMOL/L (ref 21–32)
COLOR UR: YELLOW
CORTIS SERPL-MCNC: 9.7 UG/DL (ref 2.5–20)
CREAT SERPL-MCNC: 1.02 MG/DL (ref 0.5–1.05)
EGFRCR SERPLBLD CKD-EPI 2021: 64 ML/MIN/1.73M*2
ERYTHROCYTE [DISTWIDTH] IN BLOOD BY AUTOMATED COUNT: 12.7 % (ref 11.5–14.5)
EST. AVERAGE GLUCOSE BLD GHB EST-MCNC: 105 MG/DL
GLUCOSE SERPL-MCNC: 100 MG/DL (ref 74–99)
GLUCOSE UR STRIP.AUTO-MCNC: NORMAL MG/DL
HBA1C MFR BLD: 5.3 %
HCT VFR BLD AUTO: 40 % (ref 36–46)
HDLC SERPL-MCNC: 59.1 MG/DL
HGB BLD-MCNC: 13 G/DL (ref 12–16)
KETONES UR STRIP.AUTO-MCNC: NEGATIVE MG/DL
LDLC SERPL CALC-MCNC: 89 MG/DL
LEUKOCYTE ESTERASE UR QL STRIP.AUTO: NEGATIVE
MCH RBC QN AUTO: 30.3 PG (ref 26–34)
MCHC RBC AUTO-ENTMCNC: 32.5 G/DL (ref 32–36)
MCV RBC AUTO: 93 FL (ref 80–100)
NITRITE UR QL STRIP.AUTO: NEGATIVE
NON HDL CHOLESTEROL: 116 MG/DL (ref 0–149)
NRBC BLD-RTO: 0 /100 WBCS (ref 0–0)
PH UR STRIP.AUTO: 5 [PH]
PLATELET # BLD AUTO: 232 X10*3/UL (ref 150–450)
POTASSIUM SERPL-SCNC: 4 MMOL/L (ref 3.5–5.3)
PROT SERPL-MCNC: 7.2 G/DL (ref 6.4–8.2)
PROT UR STRIP.AUTO-MCNC: NEGATIVE MG/DL
RBC # BLD AUTO: 4.29 X10*6/UL (ref 4–5.2)
RBC # UR STRIP.AUTO: NEGATIVE /UL
SODIUM SERPL-SCNC: 139 MMOL/L (ref 136–145)
SP GR UR STRIP.AUTO: 1.02
TRIGL SERPL-MCNC: 134 MG/DL (ref 0–149)
TSH SERPL-ACNC: 2.31 MIU/L (ref 0.44–3.98)
UROBILINOGEN UR STRIP.AUTO-MCNC: NORMAL MG/DL
VLDL: 27 MG/DL (ref 0–40)
WBC # BLD AUTO: 5.3 X10*3/UL (ref 4.4–11.3)

## 2024-11-07 PROCEDURE — 84443 ASSAY THYROID STIM HORMONE: CPT

## 2024-11-07 PROCEDURE — 36415 COLL VENOUS BLD VENIPUNCTURE: CPT

## 2024-11-07 PROCEDURE — 83036 HEMOGLOBIN GLYCOSYLATED A1C: CPT

## 2024-11-07 PROCEDURE — 80053 COMPREHEN METABOLIC PANEL: CPT

## 2024-11-07 PROCEDURE — 85027 COMPLETE CBC AUTOMATED: CPT

## 2024-11-07 PROCEDURE — 81003 URINALYSIS AUTO W/O SCOPE: CPT

## 2024-11-07 PROCEDURE — 82533 TOTAL CORTISOL: CPT

## 2024-11-07 PROCEDURE — 80061 LIPID PANEL: CPT

## 2024-11-25 ENCOUNTER — APPOINTMENT (OUTPATIENT)
Dept: RADIOLOGY | Facility: CLINIC | Age: 59
End: 2024-11-25
Payer: COMMERCIAL

## 2024-11-27 ENCOUNTER — HOSPITAL ENCOUNTER (OUTPATIENT)
Dept: RADIOLOGY | Facility: CLINIC | Age: 59
Discharge: HOME | End: 2024-11-27
Payer: COMMERCIAL

## 2024-11-27 DIAGNOSIS — M54.9 BACK PAIN, UNSPECIFIED BACK LOCATION, UNSPECIFIED BACK PAIN LATERALITY, UNSPECIFIED CHRONICITY: ICD-10-CM

## 2024-11-27 PROCEDURE — 72148 MRI LUMBAR SPINE W/O DYE: CPT

## 2024-11-27 PROCEDURE — 72148 MRI LUMBAR SPINE W/O DYE: CPT | Performed by: RADIOLOGY

## 2024-12-02 ENCOUNTER — TELEPHONE (OUTPATIENT)
Dept: NEUROSURGERY | Facility: HOSPITAL | Age: 59
End: 2024-12-02

## 2024-12-03 ENCOUNTER — HOSPITAL ENCOUNTER (OUTPATIENT)
Dept: RADIOLOGY | Facility: CLINIC | Age: 59
Discharge: HOME | End: 2024-12-03
Payer: COMMERCIAL

## 2024-12-03 ENCOUNTER — OFFICE VISIT (OUTPATIENT)
Dept: NEUROSURGERY | Facility: CLINIC | Age: 59
End: 2024-12-03
Payer: COMMERCIAL

## 2024-12-03 VITALS
DIASTOLIC BLOOD PRESSURE: 90 MMHG | TEMPERATURE: 96.6 F | HEIGHT: 64 IN | BODY MASS INDEX: 41.32 KG/M2 | SYSTOLIC BLOOD PRESSURE: 142 MMHG | HEART RATE: 71 BPM | WEIGHT: 242 LBS

## 2024-12-03 DIAGNOSIS — M48.062 LUMBAR STENOSIS WITH NEUROGENIC CLAUDICATION: ICD-10-CM

## 2024-12-03 DIAGNOSIS — M54.2 NECK PAIN: Primary | ICD-10-CM

## 2024-12-03 DIAGNOSIS — M47.816 LUMBAR SPONDYLOSIS: ICD-10-CM

## 2024-12-03 DIAGNOSIS — M43.16 SPONDYLOLISTHESIS AT L4-L5 LEVEL: ICD-10-CM

## 2024-12-03 DIAGNOSIS — M48.061 LUMBAR FORAMINAL STENOSIS: ICD-10-CM

## 2024-12-03 DIAGNOSIS — M54.9 BACK PAIN, UNSPECIFIED BACK LOCATION, UNSPECIFIED BACK PAIN LATERALITY, UNSPECIFIED CHRONICITY: ICD-10-CM

## 2024-12-03 DIAGNOSIS — M54.16 LUMBAR RADICULOPATHY, CHRONIC: ICD-10-CM

## 2024-12-03 PROCEDURE — 3080F DIAST BP >= 90 MM HG: CPT | Performed by: NEUROLOGICAL SURGERY

## 2024-12-03 PROCEDURE — 72110 X-RAY EXAM L-2 SPINE 4/>VWS: CPT

## 2024-12-03 PROCEDURE — 3077F SYST BP >= 140 MM HG: CPT | Performed by: NEUROLOGICAL SURGERY

## 2024-12-03 PROCEDURE — 72110 X-RAY EXAM L-2 SPINE 4/>VWS: CPT | Performed by: RADIOLOGY

## 2024-12-03 PROCEDURE — 3008F BODY MASS INDEX DOCD: CPT | Performed by: NEUROLOGICAL SURGERY

## 2024-12-03 PROCEDURE — 99205 OFFICE O/P NEW HI 60 MIN: CPT | Performed by: NEUROLOGICAL SURGERY

## 2024-12-03 PROCEDURE — 99215 OFFICE O/P EST HI 40 MIN: CPT | Performed by: NEUROLOGICAL SURGERY

## 2024-12-03 PROCEDURE — 1036F TOBACCO NON-USER: CPT | Performed by: NEUROLOGICAL SURGERY

## 2024-12-03 ASSESSMENT — ENCOUNTER SYMPTOMS
OCCASIONAL FEELINGS OF UNSTEADINESS: 1
LOSS OF SENSATION IN FEET: 1

## 2024-12-03 ASSESSMENT — PAIN SCALES - GENERAL: PAINLEVEL_OUTOF10: 8

## 2024-12-03 NOTE — PROGRESS NOTES
It was a pleasure to see Ms. Dale at the Neurosurgery Spine Clinic at [facility].     She is a really nice 59 y.o. female  who presents to us with complaints LOW BACK PAIN radiating to RIGHT LEG and LEFT LEG that started about  10  years  ago, and have been gradually worsening since that time.  The symptoms started after no known injury.  The symptoms however gotten worse over the past 1 year or so.    The pain is 10 /10. The pain is described as pinching, sharp, soreness, stabbing, and tingling and occurs all day.  Symptoms are exacerbated by standing for more than 5 minutes and walking for more than 1 minutes. Factors which relieve the pain include change in body position      She has a significant numbness and tingling and weakness in the lower extremities when she ambulates and she cannot walk more than a block or 2 as compared to the fact that she cannot walk more than a mile or 2 about a year back or so.    Numbness and/or tingling - YES numbness both legs when walking, middle toe to the baby toe is numb on both feet    Weakness : YES both legs    Bladder/Bowel symptoms - YES    The patient has tried medications (eg: gabapentin, NSAIDS and narcotics ) : YES TYLENOL  PT : No.  She has not done formal physical therapy but has been doing exercises recommended by physical therapist.   she is a NON-SMOKER and NON-DIABETIC    History of Depression : NO    PRIOR SPINE SURGERY: NO    Denies use of Aspirin, Coumadin, or Plavix or any other anticoagulants     NARCOTICS for pain : NO    Part of this patient’s history is from personal review of the patient’s previous charts.      Past Medical History:   Diagnosis Date    Encounter for screening, unspecified     Visit for screening           Current Outpatient Medications:     clotrimazole-betamethasone (Lotrisone) cream, APPLY TO AFFECTED AREA TWICE A DAY, Disp: 45 g, Rfl: 2    cyclobenzaprine (Flexeril) 10 mg tablet, Take 1 tablet (10 mg) by mouth as needed  at bedtime for muscle spasms., Disp: 30 tablet, Rfl: 2    escitalopram (Lexapro) 20 mg tablet, Take 1 tablet (20 mg) by mouth once daily., Disp: 90 tablet, Rfl: 0    fluocinolone (Derma-Smoothe/FS Body Oil) 0.01 % external oil, Apply topically 3 times a day., Disp: 118 mL, Rfl: 0    metoprolol succinate XL (Toprol-XL) 50 mg 24 hr tablet, Take 1 tablet (50 mg) by mouth once daily. Do not crush or chew., Disp: 30 tablet, Rfl: 5    nabumetone (Relafen) 750 mg tablet, Take 1 tablet (750 mg) by mouth 2 times a day., Disp: 60 tablet, Rfl: 2    omeprazole (PriLOSEC) 40 mg DR capsule, TAKE 1 CAPSULE BY MOUTH EVERY DAY, Disp: 90 capsule, Rfl: 1    valACYclovir (Valtrex) 500 mg tablet, TAKE 1 TABLET BY MOUTH EVERY DAY, Disp: 90 tablet, Rfl: 1      Review of Systems :   Constitutional: No fever or chills  Respiratory: No shortness of breath or wheezing  Musculoskeletal: see HPI above   Neurologic: See HPI above      EXAM:   There were no vitals filed for this visit.  GENERAL: no apparent distress  EYES: No icterus;  extraocular movements intact       MUSCULOSKELETAL:   Stance mildly flexed.   Gait: Antalgic.  No obvious malalignment in sagittal and coronal plane on inspection.   Range of motion of the lumbar spine is decreased secondary to pain.  Paraspinal muscle spasm/tenderness absent. No palpable tenderness along the spine.   ANASTASIA test : Negative   SLR test: Negative       NEUROLOGIC:   Patient is awake, alert and oriented to name, place and time.  No obvious cranial nerve deficit.   Motor: Bulk and tone WNL.  Strength:   Moving all extremities with 5/5 (MRC) strength  Reflexes are symmetric throughout.   Sensory exam shows no gross dermatomal sensory deficits to light touch and pain  Romberg test: Negative  More's sign absent  Distal pulses well palpable in the lower extremity.      IMAGING:   AP and lateral x-rays of the lumbar spine that were done today were reviewed personally and shows evidence of asymmetric disc  degeneration at L4-5 with the collapse of the disc space on the right causing foraminal stenosis at that level.  There is evidence of L4-5 grade 1 spondylolisthesis measuring about 8 mm in flexion with about 3 to 4 mm of motion between flexion extension films.    MRI of the lumbar spine done on the 11/27/2024 was also reviewed personally and shows presence of severe L4-5 central canal stenosis with moderate bilateral neuroforaminal stenosis with evidence of facet joint widening as evidenced by T2 hyperintensity involving the L4-5 facet joints.    ASSESSMENT AND PLAN:    Ms Dale is a really nice 59-year-old young lady who presents to me with signs and symptoms of low back pain and neurogenic claudication along with right lumbar radiculopathy that have been present for a number of years but have significantly gotten worse over the past 1 year or so to the point that it is affecting her activity of daily living to a significant degree.       I have reviewed imaging and diagnosis with the patient, discussed the natural history of the disease and both non-operative and operative treatments available and rationale vs risks for both.      Her MRI shows presence of a L4-5 spondylolisthesis with moderate to severe central canal and moderate foraminal stenosis.    The patient's clinical symptoms correlates well with the radiological findings.    She has been having significant functional impairment with decreased ability to perform his ADL secondary to pain and signs and symptoms of neurogenic claudication.The pain has been debilitating on a daily basis with a score of more than 5 on scale of 0 - 10.     The patient's mFI-5 score is Robust - 0.    She does have an elevated BMI of 41 and is aggressively working on losing weight and trying her best.     She has been seeing pain management and getting epidural steroid injections that have been helping her only transiently.  She has been doing a lot of exercises at home as  recommended by physical therapist and did not felt that she needed to do formal physical therapy.  At this point I did provide her a referral for physical therapy that she is going to attempt over the next few weeks or so.    Considering the degree of pain and disability secondary to presence of L4-5 spondylolisthesis with severe central canal and moderate foraminal stenosis , surgery is indicated and medically necessary to improve the quality of life and day to day functioning especially considering the fact that he has objective neurological deficit in the left lower extremity with the weakness which is functionally limiting and also loss of sensation and numbness affecting his ambulatory capacity for his day-to-day activities.     I discussed with her various surgical options at this point in including laminectomy alone posterior laminectomy and fusion or direct lateral approach with indirect decompression and fusion with the pros and cons of either approach.  Given her stenosis with spondylolisthesis and her elevated BMI I believe she would be an excellent candidate for an indirect decompression via lateral approach with posterior stabilization.    I offered her the option of surgery that would consist of LEFT sided L4-5 Castillo psoas lumbar interbody fusion for indirect decompression using BMP with cage placement  followed by posterior L4-5 nonsegmental instrumentation.     I have explained the surgical procedure in detail with expected duration and extent of recovery along risks of surgery that include, but not limited to bleeding, infection, blood vessel injury or damage,  nerve injury/damage resulting in weakness/complete paralysis, loss of sensation, loss of bladder, bowel or sexual function, malunion, nonunion, CSF leak, brachial plexus injury, peripheral vision blindness, injury to the abdominal contents, thigh sensorimotor changes and proximal thigh weakness ( mostly temporary) , failure of implants/fusion,  failure to relieve symptoms, recurrent disease, adjacent segment disease, need to reoperate for any reason and general anesthesia reaction such as stroke, coma, heart attack, delirium, confusion, death as well as worsening of preexisted medical conditions and any other unforeseen complication related to unrelated to the spinal procedure per se.    I also explained to her the mechanics of an indirect decompression with stabilization in her case and mentioned to her that the relief may take some time with this approach and there is about 10 to 20% chances that if she continues to be symptomatic despite that she may need the formal direct decompression in that case.     A Shared decision was made to proceed with surgery after involving the patient in the treatment decision-making process.  The patient clearly expressed understanding of possible risks and benefits of surgery and the realistic expectations of surgery along with the fact that the goal of the surgery is to improve the  overall functioning and quality of life by improvement of the current level of function,  possible improvement and/or prevent progression of preexisting neurological deficits and not necessarily 100 % pain relief. There is no guarantee that the prexisiting deficits will improve definitely after surgery. Also discussed with the patient a small but possible chances of worsening of her symptoms or development of new symptoms despite a successful surgery that may be worse than what he has now. The option of continued non-operative management was very clearly discussed as well with its associated risks and benefits and the patient was clearly provided that option.       It was a pleasure to participate in Ms. Dale clinical care. All questions were answered to her satisfaction and she explained understanding of the further treatment plan.     Juwan Hickey MD, [facility], FCNS  Active Scoliosis Research Society (SRS) Fellow    of Neurological Surgery  [employer] [facility] [employer]  Attending Surgeon  Director - Minimally Invasive Spine Surgery  [place], [place]      ---Some of this note was completed using Dragon voice recognition technology and sometimes the software misinterprets words. This may include unintended errors with respect to translation of words, typographical errors or grammar errors which may not have been identified prior to finalization of the chart note. Please take this into account when reading this note---

## 2024-12-06 PROBLEM — M48.061 LUMBAR FORAMINAL STENOSIS: Status: ACTIVE | Noted: 2024-12-03

## 2024-12-06 PROBLEM — M48.062 LUMBAR STENOSIS WITH NEUROGENIC CLAUDICATION: Status: ACTIVE | Noted: 2024-12-03

## 2024-12-06 PROBLEM — M43.16 SPONDYLOLISTHESIS AT L4-L5 LEVEL: Status: ACTIVE | Noted: 2024-12-03

## 2024-12-06 PROBLEM — M54.16 LUMBAR RADICULOPATHY, CHRONIC: Status: ACTIVE | Noted: 2024-12-03

## 2024-12-11 ENCOUNTER — APPOINTMENT (OUTPATIENT)
Dept: PHYSICAL THERAPY | Facility: CLINIC | Age: 59
End: 2024-12-11
Payer: COMMERCIAL

## 2024-12-16 DIAGNOSIS — M47.816 LUMBAR SPONDYLOSIS: ICD-10-CM

## 2024-12-16 DIAGNOSIS — M48.061 LUMBAR FORAMINAL STENOSIS: ICD-10-CM

## 2024-12-16 DIAGNOSIS — M43.16 SPONDYLOLISTHESIS AT L4-L5 LEVEL: ICD-10-CM

## 2024-12-16 DIAGNOSIS — M54.16 LUMBAR RADICULOPATHY, CHRONIC: ICD-10-CM

## 2024-12-16 DIAGNOSIS — M48.062 LUMBAR STENOSIS WITH NEUROGENIC CLAUDICATION: ICD-10-CM

## 2024-12-17 ENCOUNTER — EVALUATION (OUTPATIENT)
Dept: PHYSICAL THERAPY | Facility: CLINIC | Age: 59
End: 2024-12-17
Payer: COMMERCIAL

## 2024-12-17 DIAGNOSIS — M47.816 LUMBAR SPONDYLOSIS: ICD-10-CM

## 2024-12-17 PROCEDURE — 97110 THERAPEUTIC EXERCISES: CPT | Mod: GP | Performed by: PHYSICAL MEDICINE & REHABILITATION

## 2024-12-17 PROCEDURE — 97161 PT EVAL LOW COMPLEX 20 MIN: CPT | Mod: GP | Performed by: PHYSICAL MEDICINE & REHABILITATION

## 2024-12-17 ASSESSMENT — PATIENT HEALTH QUESTIONNAIRE - PHQ9
SUM OF ALL RESPONSES TO PHQ9 QUESTIONS 1 AND 2: 0
1. LITTLE INTEREST OR PLEASURE IN DOING THINGS: NOT AT ALL
2. FEELING DOWN, DEPRESSED OR HOPELESS: NOT AT ALL

## 2024-12-17 ASSESSMENT — COLUMBIA-SUICIDE SEVERITY RATING SCALE - C-SSRS
1. IN THE PAST MONTH, HAVE YOU WISHED YOU WERE DEAD OR WISHED YOU COULD GO TO SLEEP AND NOT WAKE UP?: NO
2. HAVE YOU ACTUALLY HAD ANY THOUGHTS OF KILLING YOURSELF?: NO
6. HAVE YOU EVER DONE ANYTHING, STARTED TO DO ANYTHING, OR PREPARED TO DO ANYTHING TO END YOUR LIFE?: NO

## 2024-12-17 ASSESSMENT — PAIN SCALES - GENERAL: PAINLEVEL_OUTOF10: 1

## 2024-12-17 ASSESSMENT — PAIN - FUNCTIONAL ASSESSMENT: PAIN_FUNCTIONAL_ASSESSMENT: 0-10

## 2024-12-17 NOTE — PROGRESS NOTES
Physical Therapy  Physical Therapy Orthopedic Evaluation    Patient Name: Kallie Dale  MRN: [id number]  Today's Date: 12/17/2024  Time Calculation  Start Time: 0728  Stop Time: 0800  Time Calculation (min): 32 min  PT Evaluation Time Entry  PT Evaluation (Low) Time Entry: 20  PT Therapeutic Procedures Time Entry  Therapeutic Exercise Time Entry: 12    Insurance:  Number of Treatments Authorized: 1 of ?        Insurance Type: [place]     Current Problem  Problem List Items Addressed This Visit             ICD-10-CM    Lumbar spondylosis M47.816    Relevant Orders    Follow Up In Physical Therapy       General:  General  Reason for Referral: Low Back Pain  Referred By: Juwan Hickey MD  General Comment: Patient presents with chronic low back pain, along with radicular symptoms down bilateral lower extremities, that has been persisting for several years. She notes that pain and symptoms have been progressively worsening over the past year. Pain is felt mainly in bilateral posterior hips, and radiates down to both thighs. Patient also notes that her legs will go numb after prolonged standing and walking, and will immediately relieve with sitting down. Patient received an epidural injection about two weeks ago, which has reduced her pain to manageable levels. Additionally, patient is scheduled to receive a lumbar fusion in a couple of months to address her chronic symptoms.    Precautions:   Precautions  Precautions Comment: None    Medical History Form: Reviewed (scanned into chart)    Subjective:   Subjective     Pain:  Pain Assessment: 0-10  0-10 (Numeric) Pain Score: 1  Pain Type: Chronic pain  Pain Location: Back  Pain Orientation: Lower  Aggravating Factors:  Standing and Walking  Relieving Factors:  Sitting    Relevant Information (PMH & Previous Tests/Imaging): MRI reveals multilevel lumbar stenosis  Previous Interventions/Treatments: Injections    Prior Level of Function (PLOF)  Patient previously  independent with all ADLs  Work/School: Business owner, works from home    Patients Living Environment: Reviewed and no concern    Primary Language: English    Patient's Goal(s) for Therapy: Acquire strategies for pain management    Red Flags: Do you have any of the following? No  Fever/chills, unexplained weight changes, dizziness/fainting, unexplained change in bowel or bladder functions, unexplained malaise or muscle weakness, night pain/sweats, numbness or tingling    Objective:  Objective     Lumbar AROM  Lumbar flexion: (60°): Fingertips to toes; no pain  Lumbar extension (25°): Moderately limited; no pain  Lumbar sidebend right (25°): Moderately limited; no pain  Lumbar sidebend left (25°): Moderately limited; no pain    Lumbar Myotomes  R Hip Flex : 3+/5  L Hip Flex (L2): (5/5): 4-/5  R Knee Ext (L3): (5/5): 4+/5  L Knee Ext (L3): (5/5) : 4+/5  R Ankle DF (L4): (5/5): 4/5  L Ankle DF (L4): (5/5): 4/5  R Great Toe Ext (L5): (5/5) : 4/5  L Great Toe Ext (L5): (5/5): 4/5  R Ankle EV (S1): (5/5): 4/5  L Ankle EV (S1): (5/5): 4/5    Specific Lower Extremity MMT  R Iliopsoas: (5/5): 4-/5  L Iliopsoas: (5/5): 4-/5    Dermatomes  Dermatomes WFL: yes    Outcome Measures:  Other Measures  Oswestry Disablity Index (ANANYA): 38%     Treatment Performed:  Therapeutic Exercise  Therapeutic Exercise Activity 1: Seated Lumbar Flexion Stretch: 2x10    Education: Discussed patient's goals, expectations regarding physical therapy, prognosis, plan of care and home exercise program.   Outpatient Education  Education Comment: Access Code: [email or web address]  URL: [email or web address]  Date: 12/17/2024  Prepared by: Marvin Chandler  - Seated Lumbar Flexion Stretch  - 3-5 x daily - 7 x weekly - 2-3 sets - 10 reps    Assessment: Patient presents with signs and symptoms consistent with lumbar radiculopathy, resulting in limited participation in pain-free ADLs and inability to perform at their prior level of  function. Pt would benefit from physical therapy to address the impairments found & listed previously in the objective section in order to return to safe and pain-free ADLs and prior level of function.       Clinical Presentation: Stable and/or uncomplicated characteristics    Personal Factors Impacting Care: BMI > 40    Plan:  PT Plan: Skilled PT  PT Frequency: 1 time per week  Duration: 8 weeks  Onset Date: 01/01/24  Number of Treatments Authorized: 1 of ?  Rehab Potential: Good  Plan of Care Agreement: Patient  Planned Interventions include: therapeutic exercise, self-care home management, manual therapy, therapeutic activities, gait training, neuromuscular coordination, vasopneumatic, dry needling, aquatic therapy    Goals: Set and Discussed Today  Active       PT Problem       Patient to score 25% or less on Modified ANANYA in order to show improvement in overall quality of life.        Start:  12/17/24    Expected End:  02/11/25            Patient to achieve 5/5 strength in bilateral hip musculature in order to improve standing and walking tolerance.        Start:  12/17/24    Expected End:  02/11/25            Patient to be able to stand for 30+ minutes without any increased symptoms in back or lower extremities in order to complete household tasks without difficulty.        Start:  12/17/24    Expected End:  02/11/25            Patient to demonstrate independence with HEP in order to establish self management of symptoms.        Start:  12/17/24    Expected End:  02/11/25                Ambulatory Screening Summary      Screening  Frequency  Date Last Completed   Spiritual and Cultural Beliefs   Screening each visit or episode of care 12/17/2024   Falls Risk Screening every ambulatory visit    Pain Screening annually at primary care visit  12/3/2024   Domestic Violence screening annually at primary care visit    Depression Screening annually in the primary care setting 12/17/2024   Suicide Risk Screening  annually in the primary care setting 12/17/2024   Nutrition and Food Insecurity   Screening at least annually at primary care visit     Key Learner annually in the primary care setting 12/17/2024   Drug Screen  4/9/2024  3:39 PM   Alcohol Screen  4/9/2024  3:39 PM   Advance Directive         Plan of care was developed with input and agreement by the patient      Marvin Church, PT

## 2025-01-03 ENCOUNTER — APPOINTMENT (OUTPATIENT)
Dept: PHYSICAL THERAPY | Facility: CLINIC | Age: 60
End: 2025-01-03
Payer: COMMERCIAL

## 2025-01-08 ENCOUNTER — APPOINTMENT (OUTPATIENT)
Dept: PHYSICAL THERAPY | Facility: CLINIC | Age: 60
End: 2025-01-08
Payer: COMMERCIAL

## 2025-01-08 DIAGNOSIS — M47.816 LUMBAR SPONDYLOSIS: Primary | ICD-10-CM

## 2025-01-08 NOTE — PROGRESS NOTES
Physical Therapy                 Therapy Communication Note    Patient Name: Kallie Dale  MRN: [id number]  Department:   Room: Room/bed info not found  Today's Date: 1/8/2025     Discipline: Physical Therapy          Missed Visit Reason: Patient cancelled via MyChart, stating she is still not feeling well.     Missed Time: Cancel    Comment:

## 2025-01-15 ENCOUNTER — TREATMENT (OUTPATIENT)
Dept: PHYSICAL THERAPY | Facility: CLINIC | Age: 60
End: 2025-01-15
Payer: COMMERCIAL

## 2025-01-15 DIAGNOSIS — M47.816 LUMBAR SPONDYLOSIS: Primary | ICD-10-CM

## 2025-01-15 PROCEDURE — 97110 THERAPEUTIC EXERCISES: CPT | Mod: GP,CQ

## 2025-01-15 ASSESSMENT — PAIN - FUNCTIONAL ASSESSMENT: PAIN_FUNCTIONAL_ASSESSMENT: 0-10

## 2025-01-15 ASSESSMENT — PAIN SCALES - GENERAL: PAINLEVEL_OUTOF10: 7

## 2025-01-15 NOTE — PROGRESS NOTES
"  Physical Therapy Treatment    Patient Name: Kallie Dale  MRN: [id number]  Today's Date: 1/15/2025  Time Calculation  Start Time: 1002  Stop Time: 1044  Time Calculation (min): 42 min  PT Therapeutic Procedures Time Entry  Therapeutic Exercise Time Entry: 44,      Current Problem  Problem List Items Addressed This Visit             ICD-10-CM    Lumbar spondylosis - Primary M47.816       Insurance:  Number of Treatments Authorized: 1 of ?            Subjective   General  Reason for Referral: Low Back Pain  Referred By: Juwan Hickey MD  General Comment: Patient states her shot is wearing off.  She c/o B buttock pain with pain in her B posterior thighs.  RFIS will help to allieviate her sx.  Sometimes her legs will get numb with prolonged walking.  She will \"wall/furniature\" walk when sx get bad. She is scheduled for surgery on 2/10/25.    Performing HEP?: Yes - RFIS frequently with good temporary relief    Precautions  Precautions  Precautions Comment: None  Pain  Pain Assessment: 0-10  0-10 (Numeric) Pain Score: 7  Pain Type: Chronic pain  Pain Location: Back  Pain Orientation: Lower    Objective   Slow guarded ambulation and transfers  Searches for objects to give her support when ambulating    Treatments:    Therapeutic Exercise  Therapeutic Exercise Activity 1: sitting - 75cm FF rollout x 3 min  Therapeutic Exercise Activity 2: sitting - 75cm multidirectional RO x 3 min  Therapeutic Exercise Activity 3: sitting on 65cm SB:  Therapeutic Exercise Activity 4: - AP pelvic tilts x 2 min  Therapeutic Exercise Activity 5: - S/S pelvic tilts x 2 min  Therapeutic Exercise Activity 6: - pelvic circles 2 x 10 ea dir  Therapeutic Exercise Activity 7: - alt heel taps x 1 min  Therapeutic Exercise Activity 8: - RTB rows 2 x 10  Therapeutic Exercise Activity 9: - RTB B shld ext 2 x 10  Therapeutic Exercise Activity 10: - RTB resisted hip ABD with tall sit 5\" x 10  Therapeutic Exercise Activity 11: - ADD ball squeeze " "with tall sit 5\" x 10  Therapeutic Exercise Activity 12: seated - RFIS with SB x 2 min                               OP EDUCATION:  Outpatient Education  Education Comment: consider Nakul avina PRN for support.                    Access Code: [email or web address]  URL: [email or web address]  Date: 01/15/2025  Prepared by: Ivelisse Mccormick    Exercises  - Pelvic Tilt on a Swiss Ball With Pelvic Contraction  - 1-3 x daily - 7 x weekly - 20 reps  - Seated Lateral Pelvic Tilt on Swiss Ball  - 1-3 x daily - 7 x weekly - 20 reps  - Pelvic Circles on Swiss Ball  - 1-3 x daily - 7 x weekly - 10 reps  - Row with Anchored Resistance on Swiss Ball  - 1 x daily - 7 x weekly - 3 sets - 10 reps  - Seated Shoulder Extension and Scapular Retraction with Resistance on Swiss Ball  - 1 x daily - 7 x weekly - 3 sets - 10 reps  - Seated Hip Abduction with Resistance  - 1 x daily - 7 x weekly - 3 sets - 10 reps - 5 hold  - Seated Hip Adduction Squeeze with Ball  - 1 x daily - 7 x weekly - 3 sets - 10 reps - 5 hold    Assessment:  PT Assessment  Assessment Comment: Patient presented with increased LPB and rad sx due to her recent injection wearing off.  Patient is scheduled for surgery 2/10/25.  Today's session focused on lumbar ROM and core stabilization.  Patient took well to the SB for painfree muscle engagement.  Warned patient regarding post exercise soreness.    Plan:  OP PT Plan  PT Plan: Skilled PT  PT Frequency: 1 time per week  Duration: 8 weeks  Onset Date: 01/01/24  Number of Treatments Authorized: 1 of ?  Rehab Potential: Good  Plan of Care Agreement: Patient    Goals:  Active       PT Problem       Patient to score 25% or less on Modified ANANYA in order to show improvement in overall quality of life.        Start:  12/17/24    Expected End:  02/11/25            Patient to achieve 5/5 strength in bilateral hip musculature in order to improve standing and walking tolerance.        Start:  12/17/24    Expected End:  " 02/11/25            Patient to be able to stand for 30+ minutes without any increased symptoms in back or lower extremities in order to complete household tasks without difficulty.        Start:  12/17/24    Expected End:  02/11/25            Patient to demonstrate independence with HEP in order to establish self management of symptoms.        Start:  12/17/24    Expected End:  02/11/25                 Christelle Mccormick, PTA

## 2025-01-22 ENCOUNTER — APPOINTMENT (OUTPATIENT)
Dept: PHYSICAL THERAPY | Facility: CLINIC | Age: 60
End: 2025-01-22
Payer: COMMERCIAL

## 2025-01-27 ENCOUNTER — CLINICAL SUPPORT (OUTPATIENT)
Dept: PREADMISSION TESTING | Facility: HOSPITAL | Age: 60
End: 2025-01-27
Payer: COMMERCIAL

## 2025-01-27 NOTE — CPM/PAT H&P
CPM/PAT Evaluation       Name: Kallie Dale (Kallie Dale)  /Age: 1965/59 y.o.     { PAT Visit Type:72695}      Chief Complaint: ***    HPI  Kallie Dale is scheduled for LEFT sided L4-5 Castillo psoas lumbar interbody fusion for indirect decompression using BMP with cage placement and L4-5 Instrumentation using [email or web address]IG[employer] Assistance - Left on 2/10/25 with Dr. Hickey.   Past Medical History:   Diagnosis Date    Anxiety     Arthritis     DVT (deep venous thrombosis) (Multi)     Encounter for screening, unspecified     Visit for screening    GERD (gastroesophageal reflux disease)     Herpes     Hyperlipidemia     Hypertension     Obesity     BMI 41    Overactive bladder     RLS (restless legs syndrome)     Spinal stenosis     Urge incontinence        Past Surgical History:   Procedure Laterality Date     SECTION, CLASSIC  2013     Section     SECTION, CLASSIC  06/10/2013     Section    COLONOSCOPY  2021    KNEE SURGERY  2013    Knee Surgery    KNEE SURGERY  06/10/2013    Knee Surgery    TONSILLECTOMY  2013    Tonsillectomy    TONSILLECTOMY  06/10/2013    Tonsillectomy       Patient  has no history on file for sexual activity.    No family history on file.    No Known Allergies    Prior to Admission medications    Medication Sig Start Date End Date Taking? Authorizing Provider   clotrimazole-betamethasone (Lotrisone) cream APPLY TO AFFECTED AREA TWICE A DAY 24   Pooja Sandhu, TERRENCE-CNP   cyclobenzaprine (Flexeril) 10 mg tablet Take 1 tablet (10 mg) by mouth as needed at bedtime for muscle spasms. 11/5/24 7/3/25  Bertram Carrillo MD   escitalopram (Lexapro) 20 mg tablet Take 1 tablet (20 mg) by mouth once daily. 24   Bertram Carrillo MD   fluocinolone (Derma-Smoothe/FS Body Oil) 0.01 % external oil Apply topically 3 times a day. 24  Bertram Carrillo MD   metoprolol succinate XL (Toprol-XL) 50  mg 24 hr tablet Take 1 tablet (50 mg) by mouth once daily. Do not crush or chew. 11/5/24 5/4/25  Bertram Carrillo MD   nabumetone (Relafen) 750 mg tablet Take 1 tablet (750 mg) by mouth 2 times a day. 11/5/24 11/5/25  Bertram Carrillo MD   omeprazole (PriLOSEC) 40 mg DR capsule TAKE 1 CAPSULE BY MOUTH EVERY DAY 7/10/23   Bertram Carrillo MD   valACYclovir (Valtrex) 500 mg tablet TAKE 1 TABLET BY MOUTH EVERY DAY 11/30/23   Bertram Carrillo MD        PAT ROS     PAT Physical Exam     Airway    Testing/Diagnostic:   ECG; 8/17/23  Sinus Rhyhtm  Long QT interval, consider hypocalcaemia or quinidine-like drug  Borderline ECG  Unconfirmed Report    XR LUMBAR SPINE COMPLETE 4+ VIEWS; 12/4/24   IMPRESSION:  Moderate diffuse lumbar degenerative changes at all levels.  Facet arthrosis leading to a 1 cm anterolisthesis L4-5 without  pathologic motion.  Mild scoliosis.    MR LUMBAR SPINE WO IV CONTRAST; 11/27/2024   IMPRESSION:  1. Moderate broad-based posterior disc osteophyte complex,  mild-to-moderate hypertrophy of the facet joints, thickened  ligamentum flavum, causing moderate deformity of the thecal sac.  Moderate bilateral neural foramina narrowing, causing mild to  moderate encroachment of the exiting nerve roots.  2. Mild-to-moderate central spinal stenosis, bilateral neural  foraminal narrowing at L2-3, L3-4, causing mild encroachment of the  bilateral exiting nerve roots.  3. Mild central spinal stenosis, mild-to-moderate bilateral neural  foramina narrowing at L5-S1, causing mild encroachment of the  bilateral exiting nerve roots.    Patient Specialist/PCP:   PCP: Bertram Carrillo MD L[facility] 11/5/24    Neurosurgery: Juwan Hickey MD [facility] 12/3/24 presents to us with complaints LOW BACK PAIN radiating to RIGHT LEG and LEFT LEG that started about 10 years  ago, and have been gradually worsening since that time.      Urogynecology: Alma Bal MD [facility] 4/9/24 UUI/OAB. Comorbidities include: HTN, hx of DVT, and GERD.    Visit Vitals  OB Status Postmenopausal   Smoking Status Never       DASI Risk Score      Flowsheet Row Questionnaire Series Submission from 12/12/2024 in [facility] with Generic Provider Dara   Can you take care of yourself (eat, dress, bathe, or use toilet)?  2.75  filed at 12/12/2024 0725   Can you walk indoors, such as around your house? 1.75  filed at 12/12/2024 0725   Can you walk a block or two on level ground?  0  filed at 12/12/2024 0725   Can you climb a flight of stairs or walk up a hill? 0  filed at 12/12/2024 0725   Can you run a short distance? 0  filed at 12/12/2024 0725   Can you do light work around the house like dusting or washing dishes? 2.7  filed at 12/12/2024 0725   Can you do moderate work around the house like vacuuming, sweeping floors or carrying groceries? 3.5  filed at 12/12/2024 0725   Can you do heavy work around the house like scrubbing floors or lifting and moving heavy furniture?  0  filed at 12/12/2024 0725   Can you do yard work like raking leaves, weeding or pushing a mower? 0  filed at 12/12/2024 0725   Can you have sexual relations? 5.25  filed at 12/12/2024 0725   Can you participate in moderate recreational activities like golf, bowling, dancing, doubles tennis or throwing a baseball or football? 0  filed at 12/12/2024 0725   Can you participate in strenous sports like swimming, singles tennis, football, basketball, or skiing? 0  filed at 12/12/2024 0725   DASI SCORE 15.95  filed at 12/12/2024 0725   METS Score (Will be calculated only when all the questions are answered) 4.7  filed at 12/12/2024 0725          Caprini DVT Assessment    No data to display       Modified Frailty Index    No data to display       CHADS2 Stroke Risk  Current as of 3 days ago        N/A 3 to 100%: High Risk   2 to < 3%: Medium Risk   0 to < 2%: Low Risk     Last Change: N/A          This score determines the patient's risk of having a stroke if the patient has atrial fibrillation.         This score is not applicable to this patient. Components are not calculated.          Revised Cardiac Risk Index    No data to display       Apfel Simplified Score    No data to display       Risk Analysis Index Results This Encounter    No data found in the last 10 encounters.       Stop Bang Score      Flowsheet Row Questionnaire Series Submission from 12/12/2024 in [facility] Care with Generic Provider Dara   Do you snore loudly? 0  filed at 12/12/2024 0725   Do you often feel tired or fatigued after your sleep? 0  filed at 12/12/2024 0725   Has anyone ever observed you stop breathing in your sleep? 0  filed at 12/12/2024 0725   Do you have or are you being treated for high blood pressure? 1  filed at 12/12/2024 0725   Recent BMI (Calculated) 41.5  filed at 12/12/2024 0725   Is BMI greater than 35 kg/m2? 1=Yes  filed at 12/12/2024 0725   Age older than 50 years old? 1=Yes  filed at 12/12/2024 0725   Gender - Male 0=No  filed at 12/12/2024 0725          Prodigy: High Risk  Total Score: 0          ARISCAT Score for Postoperative Pulmonary Complications    No data to display       [place] Perioperative Risk for Myocardial Infarction or Cardiac Arrest (FERMÍN)    No data to display         Assessment and Plan:   Unable to reach the patient, LVM.  ONLY    Saray Gallegos RN  Pre-Admission Testing   {[facility] EMBEDDED ASSESSMENT AND PLAN:86338}

## 2025-01-29 ENCOUNTER — APPOINTMENT (OUTPATIENT)
Dept: PHYSICAL THERAPY | Facility: CLINIC | Age: 60
End: 2025-01-29
Payer: COMMERCIAL

## 2025-01-30 ENCOUNTER — PRE-ADMISSION TESTING (OUTPATIENT)
Dept: PREADMISSION TESTING | Facility: HOSPITAL | Age: 60
End: 2025-01-30
Payer: COMMERCIAL

## 2025-01-30 VITALS
DIASTOLIC BLOOD PRESSURE: 95 MMHG | BODY MASS INDEX: 43.02 KG/M2 | TEMPERATURE: 97.3 F | SYSTOLIC BLOOD PRESSURE: 126 MMHG | HEART RATE: 78 BPM | RESPIRATION RATE: 16 BRPM | WEIGHT: 252 LBS | HEIGHT: 64 IN

## 2025-01-30 DIAGNOSIS — M43.16 SPONDYLOLISTHESIS AT L4-L5 LEVEL: Primary | ICD-10-CM

## 2025-01-30 LAB
ABO GROUP (TYPE) IN BLOOD: NORMAL
ANION GAP SERPL CALC-SCNC: 14 MMOL/L (ref 10–20)
ANTIBODY SCREEN: NORMAL
BUN SERPL-MCNC: 16 MG/DL (ref 6–23)
CALCIUM SERPL-MCNC: 9.7 MG/DL (ref 8.6–10.6)
CHLORIDE SERPL-SCNC: 99 MMOL/L (ref 98–107)
CO2 SERPL-SCNC: 27 MMOL/L (ref 21–32)
CREAT SERPL-MCNC: 0.79 MG/DL (ref 0.5–1.05)
EGFRCR SERPLBLD CKD-EPI 2021: 86 ML/MIN/1.73M*2
ERYTHROCYTE [DISTWIDTH] IN BLOOD BY AUTOMATED COUNT: 12.4 % (ref 11.5–14.5)
GLUCOSE SERPL-MCNC: 95 MG/DL (ref 74–99)
HCT VFR BLD AUTO: 43.1 % (ref 36–46)
HGB BLD-MCNC: 14.3 G/DL (ref 12–16)
MCH RBC QN AUTO: 30.2 PG (ref 26–34)
MCHC RBC AUTO-ENTMCNC: 33.2 G/DL (ref 32–36)
MCV RBC AUTO: 91 FL (ref 80–100)
NRBC BLD-RTO: 0 /100 WBCS (ref 0–0)
PLATELET # BLD AUTO: 287 X10*3/UL (ref 150–450)
POTASSIUM SERPL-SCNC: 4.9 MMOL/L (ref 3.5–5.3)
RBC # BLD AUTO: 4.74 X10*6/UL (ref 4–5.2)
RH FACTOR (ANTIGEN D): NORMAL
SODIUM SERPL-SCNC: 135 MMOL/L (ref 136–145)
WBC # BLD AUTO: 8.5 X10*3/UL (ref 4.4–11.3)

## 2025-01-30 PROCEDURE — 86901 BLOOD TYPING SEROLOGIC RH(D): CPT

## 2025-01-30 PROCEDURE — 36415 COLL VENOUS BLD VENIPUNCTURE: CPT

## 2025-01-30 PROCEDURE — 80048 BASIC METABOLIC PNL TOTAL CA: CPT

## 2025-01-30 PROCEDURE — 85027 COMPLETE CBC AUTOMATED: CPT

## 2025-01-30 PROCEDURE — 99204 OFFICE O/P NEW MOD 45 MIN: CPT | Performed by: NURSE PRACTITIONER

## 2025-01-30 PROCEDURE — 87081 CULTURE SCREEN ONLY: CPT

## 2025-01-30 RX ORDER — CHLORHEXIDINE GLUCONATE ORAL RINSE 1.2 MG/ML
15 SOLUTION DENTAL AS NEEDED
Qty: 473 ML | Refills: 0 | Status: SHIPPED | OUTPATIENT
Start: 2025-01-30 | End: 2025-02-01

## 2025-01-30 RX ORDER — CHLORHEXIDINE GLUCONATE 40 MG/ML
SOLUTION TOPICAL DAILY PRN
Qty: 473 ML | Refills: 0 | Status: SHIPPED | OUTPATIENT
Start: 2025-01-30 | End: 2025-02-04

## 2025-01-30 ASSESSMENT — DUKE ACTIVITY SCORE INDEX (DASI)
CAN YOU TAKE CARE OF YOURSELF (EAT, DRESS, BATHE, OR USE TOILET): YES
CAN YOU PARTICIPATE IN MODERATE RECREATIONAL ACTIVITIES LIKE GOLF, BOWLING, DANCING, DOUBLES TENNIS OR THROWING A BASEBALL OR FOOTBALL: NO
CAN YOU HAVE SEXUAL RELATIONS: YES
CAN YOU DO MODERATE WORK AROUND THE HOUSE LIKE VACUUMING, SWEEPING FLOORS OR CARRYING GROCERIES: NO
CAN YOU PARTICIPATE IN STRENOUS SPORTS LIKE SWIMMING, SINGLES TENNIS, FOOTBALL, BASKETBALL, OR SKIING: NO
CAN YOU WALK INDOORS, SUCH AS AROUND YOUR HOUSE: YES
CAN YOU CLIMB A FLIGHT OF STAIRS OR WALK UP A HILL: NO
CAN YOU WALK A BLOCK OR TWO ON LEVEL GROUND: NO
CAN YOU DO YARD WORK LIKE RAKING LEAVES, WEEDING OR PUSHING A MOWER: NO
DASI METS SCORE: 4.3
CAN YOU DO LIGHT WORK AROUND THE HOUSE LIKE DUSTING OR WASHING DISHES: YES
CAN YOU DO HEAVY WORK AROUND THE HOUSE LIKE SCRUBBING FLOORS OR LIFTING AND MOVING HEAVY FURNITURE: NO
TOTAL_SCORE: 12.45
CAN YOU RUN A SHORT DISTANCE: NO

## 2025-01-30 ASSESSMENT — ENCOUNTER SYMPTOMS
CONSTITUTIONAL NEGATIVE: 1
NECK NEGATIVE: 1
CARDIOVASCULAR NEGATIVE: 1
GASTROINTESTINAL NEGATIVE: 1
RESPIRATORY NEGATIVE: 1
MUSCULOSKELETAL NEGATIVE: 1
EYES NEGATIVE: 1
ENDOCRINE NEGATIVE: 1
NEUROLOGICAL NEGATIVE: 1

## 2025-01-30 ASSESSMENT — LIFESTYLE VARIABLES: SMOKING_STATUS: NONSMOKER

## 2025-01-30 NOTE — CPM/PAT H&P
CPM/PAT Evaluation       Name: Kallie Dale (Kallie Dale)  /Age: 1965/59 y.o.     Visit Type:   In-Person       Chief Complaint: Spondylolisthesis at L4-L5 level  Lumbar foraminal stenosis  Lumbar radiculopathy, chronic  Lumbar stenosis with neurogenic claudication    HPI  Patient is a 59-year-old female with L4-5 spondylolisthesis with moderate to severe central canal and moderate foraminal stenosis.  Patient is being evaluated in CPM in anticipation of left sided L4-5 Castillo psoas lumbar interbody fusion for indirect decompression using BMP with cage placement and L4-5 Instrumentation using GLO[employer] GPS Robotic Navigation Assistance with Dr. Hickey on 2-10-25.  Past Medical History:   Diagnosis Date    Anxiety     on lexapro    Arthritis     Encounter for screening, unspecified     Visit for screening    GERD (gastroesophageal reflux disease)     controlled    Herpes     Hyperlipidemia     Hypertension     Obesity     BMI 41    Overactive bladder     Spinal stenosis     Urge incontinence        Past Surgical History:   Procedure Laterality Date     SECTION, CLASSIC      x2    COLONOSCOPY  2021    KNEE SURGERY      Knee Surgery    TONSILLECTOMY      Tonsillectomy       Patient  has no history on file for sexual activity.    No family history on file.    No Known Allergies    Prior to Admission medications    Medication Sig Start Date End Date Taking? Authorizing Provider   clotrimazole-betamethasone (Lotrisone) cream APPLY TO AFFECTED AREA TWICE A DAY 24   TERRENCE Garcia-CNP   cyclobenzaprine (Flexeril) 10 mg tablet Take 1 tablet (10 mg) by mouth as needed at bedtime for muscle spasms. 11/5/24 7/3/25  Bertram Carrillo MD   escitalopram (Lexapro) 20 mg tablet Take 1 tablet (20 mg) by mouth once daily. 24   Bertram Carrillo MD   fluocinolone (Derma-Smoothe/FS Body Oil) 0.01 % external oil Apply topically 3 times a day. 24  Bertram Carrillo MD  "  metoprolol succinate XL (Toprol-XL) 50 mg 24 hr tablet Take 1 tablet (50 mg) by mouth once daily. Do not crush or chew. 11/5/24 5/4/25  Bertram Carrillo MD   nabumetone (Relafen) 750 mg tablet Take 1 tablet (750 mg) by mouth 2 times a day. 11/5/24 11/5/25  Bertram Carrillo MD   omeprazole (PriLOSEC) 40 mg DR capsule TAKE 1 CAPSULE BY MOUTH EVERY DAY 7/10/23   Bertram Carrillo MD   valACYclovir (Valtrex) 500 mg tablet TAKE 1 TABLET BY MOUTH EVERY DAY  Patient taking differently: Take 1 tablet (500 mg) by mouth if needed. 11/30/23   Bertram Carrillo MD        PAT ROS:   Constitutional:   neg    Neuro/Psych:   neg    Eyes:   neg    Ears:   neg    Nose:   neg    Mouth:   neg    Throat:   neg    Neck:   neg    Cardio:   neg    Respiratory:   neg    Endocrine:   neg    GI:   neg    :   neg    Musculoskeletal:   neg    Hematologic:   neg    Skin:  neg        Physical Exam  Vitals reviewed.   Constitutional:       Appearance: Normal appearance.   HENT:      Head: Normocephalic and atraumatic.      Nose: Nose normal.      Mouth/Throat:      Mouth: Mucous membranes are moist.   Cardiovascular:      Rate and Rhythm: Normal rate and regular rhythm.      Pulses: Normal pulses.   Pulmonary:      Effort: Pulmonary effort is normal.   Abdominal:      Palpations: Abdomen is soft.   Musculoskeletal:         General: Normal range of motion.      Cervical back: Normal range of motion.   Skin:     General: Skin is warm.   Neurological:      General: No focal deficit present.      Mental Status: She is alert and oriented to person, place, and time.   Psychiatric:         Mood and Affect: Mood normal.         Behavior: Behavior normal.          PAT AIRWAY:   Airway:     Mallampati::  II    TM distance::  >3 FB    Neck ROM::  Full  normal        Testing/Diagnostic:     Patient Specialist/PCP:     Visit Vitals  BP (!) 126/95   Pulse 78   Temp 36.3 °C (97.3 °F)   Ht 1.626 m (5' 4\")   Wt 114 kg (252 lb)   BMI 43.26 kg/m²   OB Status " Postmenopausal   Smoking Status Never   BSA 2.27 m²       DASI Risk Score      Flowsheet Row Pre-Admission Testing from 1/30/2025 in [facility] Questionnaire Series Submission from 12/12/2024 in [facility] with Generic Provider Dara   Can you take care of yourself (eat, dress, bathe, or use toilet)?  2.75 filed at 01/30/2025 1312 2.75  filed at 12/12/2024 0725   Can you walk indoors, such as around your house? 1.75 filed at 01/30/2025 1312 1.75  filed at 12/12/2024 0725   Can you walk a block or two on level ground?  0 filed at 01/30/2025 1312 0  filed at 12/12/2024 0725   Can you climb a flight of stairs or walk up a hill? 0 filed at 01/30/2025 1312 0  filed at 12/12/2024 0725   Can you run a short distance? 0 filed at 01/30/2025 1312 0  filed at 12/12/2024 0725   Can you do light work around the house like dusting or washing dishes? 2.7 filed at 01/30/2025 1312 2.7  filed at 12/12/2024 0725   Can you do moderate work around the house like vacuuming, sweeping floors or carrying groceries? 0 filed at 01/30/2025 1312 3.5  filed at 12/12/2024 0725   Can you do heavy work around the house like scrubbing floors or lifting and moving heavy furniture?  0 filed at 01/30/2025 1312 0  filed at 12/12/2024 0725   Can you do yard work like raking leaves, weeding or pushing a mower? 0 filed at 01/30/2025 1312 0  filed at 12/12/2024 0725   Can you have sexual relations? 5.25 filed at 01/30/2025 1312 5.25  filed at 12/12/2024 0725   Can you participate in moderate recreational activities like golf, bowling, dancing, doubles tennis or throwing a baseball or football? 0 filed at 01/30/2025 1312 0  filed at 12/12/2024 0725   Can you participate in strenous sports like swimming, singles tennis, football, basketball, or skiing? 0 filed at 01/30/2025 1312 0  filed at 12/12/2024 0725   DASI SCORE 12.45 filed at 01/30/2025 1312 15.95  filed at 12/12/2024 0725   METS Score (Will be calculated only when all the  questions are answered) 4.3 filed at 01/30/2025 1312 4.7  filed at 12/12/2024 0725          Caprini DVT Assessment    No data to display       Modified Frailty Index    No data to display       CHADS2 Stroke Risk  Current as of 2 days ago (Tuesday)        N/A 3 to 100%: High Risk   2 to < 3%: Medium Risk   0 to < 2%: Low Risk     Last Change: N/A          This score determines the patient's risk of having a stroke if the patient has atrial fibrillation.        This score is not applicable to this patient. Components are not calculated.          Revised Cardiac Risk Index    No data to display       Apfel Simplified Score    No data to display       Risk Analysis Index Results This Encounter    No data found in the last 10 encounters.       Stop Bang Score      Flowsheet Row Pre-Admission Testing from 1/30/2025 in [facility] Questionnaire Series Submission from 12/12/2024 in [facility] with Generic Provider Dara   Do you snore loudly? 1 filed at 01/30/2025 1312 0  filed at 12/12/2024 0725   Do you often feel tired or fatigued after your sleep? 0 filed at 01/30/2025 1312 0  filed at 12/12/2024 0725   Has anyone ever observed you stop breathing in your sleep? 0 filed at 01/30/2025 1312 0  filed at 12/12/2024 0725   Do you have or are you being treated for high blood pressure? 0 filed at 01/30/2025 1312 1  filed at 12/12/2024 0725   Recent BMI (Calculated) 43.2 filed at 01/30/2025 1312 41.5  filed at 12/12/2024 0725   Is BMI greater than 35 kg/m2? 1=Yes filed at 01/30/2025 1312 1=Yes  filed at 12/12/2024 0725   Age older than 50 years old? 1=Yes filed at 01/30/2025 1312 1=Yes  filed at 12/12/2024 0725   Is your neck circumference greater than 17 inches (Male) or 16 inches (Female)? 0 filed at 01/30/2025 1312 --   Gender - Male 0=No filed at 01/30/2025 1312 0=No  filed at 12/12/2024 0725   STOP-BANG Total Score 3 filed at 01/30/2025 1312 --          Prodigy: High Risk  Total Score: 0           ARISCAT Score for Postoperative Pulmonary Complications    No data to display       [place] Perioperative Risk for Myocardial Infarction or Cardiac Arrest (FERMÍN)    No data to display         Assessment and Plan:     Anesthesia  The patient denies problems with anesthesia in the past such as PONV, prolonged sedation, awareness, dental damage, aspiration, cardiac arrest, difficult intubation, or unexpected hospital admissions.     Neurology  The patient has no neurological diagnoses or significant findings on chart review, clinical presentation, and evaluation. No grossly apparent neurological perioperative risk. The patient is at increased risk for perioperative stroke secondary to hyperlipidemia, female gender, general anesthesia, operative time >2.5 hours.    HEENT/Airway  No diagnoses, significant findings on chart review, clinical presentation, or evaluation. No documented or reported history of airway difficulty.     Cardiovascular  The patient is scheduled for non-cardiac surgery associated with elevated risk. The patient has no major cardiac contraindications to non- cardiac surgery.  RCRI  The patient meets 0 RCRI criteria and therefor has a 3.9% risk of major adverse cardiac complications.  METS  The patient's functional capacity is greater than 4 METS.  EKG  The patient has no EKG or echocardiographic changes concerning for myocardial ischemia.   Heart Failure  The patient has no known history of heart failure.  Additionally, the patient reports no symptoms of heart failure and demonstrates no signs of heart failure.  Hypertension Evaluation  The patient has a known history of hypertension that is controlled.  Patient's hypertension is most consistent with stage 1.  Heart Rhythm Evaluation  The patient has no history of arrhythmias.  Heart Valve Evaluation  The patient has no known history of valvular heart disease. The patient has no symptoms or physical exam findings to suggest valvular heart  disease.  Cardiology Evaluation  The patient is not followed by cardiology.    The patient has a 30-day risk for MACE of 0 predictors, 3.9% risk for cardiac death, nonfatal myocardial infarction, and nonfatal cardiac arrest.  FERMÍN score which indicates a 0.1% risk of intraoperative or 30-day postoperative MACE (major adverse cardiac event).    Pulmonary  No significant findings on chart review or clinical presentation and evaluation. The patient is at increased risk of perioperative pulmonary complications secondary to neurosurgery, morbid obesity.    The patient has a stop bang score of 3, which places patient at intermediate risk for having KOFI.    ARISCAT 26, Intermediate, 13.3% risk of in-hospital postoperative pulmonary complications  PRODIGY 0, low risk of respiratory depression episode. Patient given PI sheet for preoperative deep breathing exercises.    Hematology  No diagnoses or significant findings on chart review or clinical presentation and evaluation.  Antiplatelet management   The patient is not currently receiving antiplatelet therapy.  Anticoagulation management  The patient is not currently receiving anticoagulation therapy.    Caprini score 9, high risk of perioperative VTE.     Patient instructed to ambulate as soon as possible postoperatively to decrease thromboembolic risk. Initiate mechanical DVT prophylaxis as soon as possible and initiate chemical prophylaxis when deemed safe from a bleeding standpoint post surgery.     Transfusion Evaluation  A type and screen was obtained given the likelihood for perioperative transfusion of blood or blood products.    Gastrointestinal  The patient has GERD    Eat 10- 0,  self-perceived oropharyngeal dysphagia scale (0-40)     Genitourinary  No diagnoses or significant findings on chart review or clinical presentation and evaluation.    Renal  No renal diagnoses or significant findings on chart review or clinical presentation and evaluation. The patient  has specific risk factors associated with increased risk of perioperative renal complications related to age greater than 55, hypertension.    Musculoskeletal  The patient has osteoarthritis    Endocrine  Diabetes Evaluation  The patient has no history of diabetes mellitus.  Thyroid Disease Evaluation  The patient has no history of thyroid disease.    ID  No diagnoses or significant findings on chart review or clinical presentation and evaluation. MRSA screening obtained. Prescriptions and instructions given for Hibiclens and Peridex.    -Preoperative medication instructions were provided and reviewed with the patient.  Any additional testing or evaluation was explained to the patient.  NPO Instructions were discussed, and the patient's questions were answered prior to conclusion of this encounter. Patient verbalized understanding of preoperative instructions. After Visit Summary given.              Calcium 9.7 8.6 - 10.6 mg/dL   Type And Screen    Collection Time: 01/30/25  1:54 PM   Result Value Ref Range    ABO TYPE A     Rh TYPE POS     ANTIBODY SCREEN NEG

## 2025-01-30 NOTE — PREPROCEDURE INSTRUCTIONS
Fasting Guidelines    NPO Instructions:    Do not eat any food after midnight the night before your surgery/procedure.  You may have up to TEN ounces of clear liquids until TWO hours before your instructed arrival time to the hospital. This includes water, black tea/coffee, (no milk or cream), apple juice, and/or electrolyte drinks (Gatorade).  You may chew gum up to TWO hours before your surgery/procedure.    Additional Instructions:     We have sent a prescription for Hibiclens soap and Peridex mouth wash to your preferred pharmacy.  If you have not already, Please  your prescription and start using as directed before surgery.  Follow the instruction sheet provided to you at your CPM/PAT appointment.    Avoid herbal supplements, multivitamins and NSAIDS (non-steroidal anti-inflammatory drugs) such as Advil, Aleve, Ibuprofen, Naproxen, Excedrin, Meloxicam or Celebrex for at least 7 days prior to surgery. May take Tylenol as needed.    Avoid tobacco and alcohol products for 24 hours prior to surgery.    CONTACT SURGEON'S OFFICE IF YOU DEVELOP:  * Fever = 100.4 F   * New respiratory symptoms (e.g. cough, shortness of breath, respiratory distress, sore throat)  * Recent loss of taste or smell  *Flu like symptoms such as headache, fatigue or gastrointestinal symptoms  * You develop any open sores, shingles, burning or painful urination   AND/OR:  * You no longer wish to have the surgery.  * Any other personal circumstances change that may lead to the need to cancel or defer this surgery.  *You were admitted to any hospital within one week of your planned procedure.    Seven/Six Days before Surgery:  Review your medication instructions, stop indicated medications    Day of Surgery:  Review your medication instructions, take indicated medications  Wear comfortable loose fitting clothing  Do not use moisturizers, creams, lotions or perfume  All jewelry and valuables should be left at home      [facility]  [phone]           Patient Information: Pre-Operative Infection Prevention Measures     Why did I have my nose, under my arms, and groin swabbed?  The purpose of the swab is to identify Staphylococcus aureus inside your nose or on your skin.  The swab was sent to the laboratory for culture.  A positive swab/culture for Staphylococcus aureus is called colonization or carriage.      What is Staphylococcus aureus?  Staphylococcus aureus, also known as “staph”, is a germ found on the skin or in the nose of healthy people.  Sometimes Staphylococcus aureus can get into the body and cause an infection.  This can be minor (such as pimples, boils, or other skin problems).  It might also be serious (such as a blood infection, pneumonia, or a surgical site infection).    What is Staphylococcus aureus colonization or carriage?  Colonization or carriage means that a person has the germ but is not sick from it.  These bacteria can be spread on the hands or when breathing or sneezing.    How is Staphylococcus aureus spread?  It is most often spread by close contact with a person or item that carries it.    What happens if my culture is positive for Staphylococcus aureus?  Your doctor/medical team will use this information to guide any antibiotic treatment which may be necessary.  Regardless of the culture results, we will clean the inside of your nose with a betadine swab just before you have your surgery.      Will I get an infection if I have Staphylococcus aureus in my nose or on my skin?  Anyone can get an infection with Staphylococcus aureus.  However, the best way to reduce your risk of infection is to follow the instructions provided to you for the use of your CHG soap and dental rinse.        Patient Information: Oral/Dental Rinse    What is oral/dental rinse?   It is a mouthwash. It is a way of cleaning the mouth with a germ-killing solution before your surgery.  The solution contains  chlorhexidine, commonly known as CHG.   It is used inside the mouth to kill a bacteria known as Staphylococcus aureus.  Let your doctor know if you are allergic to Chlorhexidine.    Why do I need to use CHG oral/dental rinse?  The CHG oral/dental rinse helps to kill a bacteria in your mouth known as Staphylococcus aureus.     This reduces the risk of infection at the surgical site.      Using your CHG oral/dental rinse  STEPS:  Use your CHG oral/dental rinse after you brush your teeth the night before (at bedtime) and the morning of your surgery.  Follow all directions on your prescription label.    Use the cap on the container to measure 15ml   Swish (gargle if you can) the mouthwash in your mouth for at least 30 seconds, (do not swallow) and spit out  After you use your CHG rinse, do not rinse your mouth with water, drink or eat.  Please refer to the prescription label for the appropriate time to resume oral intake      What side effects might I have using the CHG oral/dental rinse?  CHG rinse will stick to plaque on the teeth.  Brush and floss just before use.  Teeth brushing will help avoid staining of plaque during use.      Patient Information: Home Preoperative Antibacterial Shower      What is a home preoperative antibacterial shower?  This shower is a way of cleaning the skin with a germ-killing solution before surgery.  The solution contains chlorhexidine, commonly known as CHG.  CHG is a skin cleanser with germ-killing ability.  Let your doctor know if you are allergic to chlorhexidine.    Why do I need to take a preoperative antibacterial shower?  Skin is not sterile.  It is best to try to make your skin as free of germs as possible before surgery.  Proper cleansing with a germ-killing soap before surgery can lower the number of germs on your skin.  This helps to reduce the risk of infection at the surgical site.  Following the instructions listed below will help you prepare your skin for surgery.       How do I use the solution?  Steps:  Begin using your CHG soap 5 days before your scheduled surgery on ________________________.    First, wash and rinse your hair using the CHG soap. Keep CHG soap away from ear canals and eyes.  Rinse completely, do not condition.  Hair extensions should be removed.  Wash your face with your normal soap and rinse.    Apply the CHG solution to a clean wet washcloth.  Turn the water off or move away from the water spray to avoid premature rinsing of the CHG soap as you are applying.   Firmly lather your entire body from the neck down.  Do not use on your face.  Pay special attention to the area(s) where your incision(s) will be located unless they are on your face.  Avoid scrubbing your skin too hard.  The important point is to have the CHG soap sit on your skin for 3 minutes.    When the 3 minutes are up, turn on the water and rinse the CHG solution off your body completely.   DO NOT wash with regular soap after you have used the CHG soap solution  Pat yourself dry with a clean, freshly-laundered towel.  DO NOT apply powders, deodorants, or lotions.  Dress in clean, freshly laundered nightclothes.    Be sure to sleep with clean, freshly laundered sheets.  Be aware that CHG will cause stains on fabrics; if you wash them with bleach after use.  Rinse your washcloth and other linens that have contact with CHG completely.  Use only non-chlorine detergents to launder the items used.   The morning of surgery is the fifth day.  Repeat the above steps and dress in clean comfortable clothing     Whom should I contact if I have any questions regarding the use of CHG soap?  Call the [facility] at [phone] if you have any questions.               Preoperative Brain Exercises    What are brain exercises?  A brain exercise is any activity that engages your thinking (cognitive) skills.    What types of activities are  considered brain exercises?  Jigsaw puzzles, crossword puzzles, word jumble, memory games, word search, and many more.  Many can be found free online or on your phone via a mobile christina.    Why should I do brain exercises before my surgery?  More recent research has shown brain exercise before surgery can lower the risk of postoperative delirium (confusion) which can be especially important for older adults.  Patients who did brain exercises for 5 to 10 hours the days before surgery, cut their risk of postoperative delirium in half up to 1 week after surgery.         [facility]    Preoperative Deep Breathing Exercises    Why it is important to do deep breathing exercises before my surgery?  Deep breathing exercises strengthen your breathing muscles.  This helps you to recover after your surgery and decreases the chance of breathing complications.      How are the deep breathing exercises done?  Sit straight with your back supported.  Breathe in deeply and slowly through your nose. Your lower rib cage should expand and your abdomen may move forward.  Hold that breath for 3 to 5 seconds.  Breathe out through pursed lips, slowly and completely.  Rest and repeat 10 times every hour while awake.  Rest longer if you become dizzy or lightheaded.         Patient and Family Education             Ways You Can Help Prevent Blood Clots             This handout explains some simple things you can do to help prevent blood clots.      Blood clots are blockages that can form in the body's veins. When a blood clot forms in your deep veins, it may be called a deep vein thrombosis, or DVT for short. Blood clots can happen in any part of the body where blood flows, but they are most common in the arms and legs. If a piece of a blood clot breaks free and travels to the lungs, it is called a pulmonary embolus (PE). A PE can be a very serious problem.         Being in the hospital or having surgery can raise your  chances of getting a blood clot because you may not be well enough to move around as much as you normally do.         Ways you can help prevent blood clots in the hospital         Wearing SCDs. SCDs stands for Sequential Compression Devices.   SCDs are special sleeves that wrap around your legs  They attach to a pump that fills them with air to gently squeeze your legs every few minutes.   This helps return the blood in your legs to your heart.   SCDs should only be taken off when walking or bathing.   SCDs may not be comfortable, but they can help save your life.               Wearing compression stockings - if your doctor orders them. These special snug fitting stockings gently squeeze your legs to help blood flow.       Walking. Walking helps move the blood in your legs.   If your doctor says it is ok, try walking the halls at least   5 times a day. Ask us to help you get up, so you don't fall.      Taking any blood thinning medicines your doctor orders.        Page 1 of 2     [facility]; 3/23   Ways you can help prevent blood clots at home       Wearing compression stockings - if your doctor orders them. ? Walking - to help move the blood in your legs.       Taking any blood thinning medicines your doctor orders.      Signs of a blood clot or PE      Tell your doctor or nurse know right away if you have of the problems listed below.    If you are at home, seek medical care right away. Call 911 for chest pain or problems breathing.               Signs of a blood clot (DVT) - such as pain,  swelling, redness or warmth in your arm or leg      Signs of a pulmonary embolism (PE) - such as chest     pain or feeling short of breath

## 2025-01-30 NOTE — H&P (VIEW-ONLY)
CPM/PAT Evaluation       Name: Kallie Dale (Kallie Dale)  /Age: 1965/59 y.o.     Visit Type:   In-Person       Chief Complaint: Spondylolisthesis at L4-L5 level  Lumbar foraminal stenosis  Lumbar radiculopathy, chronic  Lumbar stenosis with neurogenic claudication    HPI  Patient is a 59-year-old female with L4-5 spondylolisthesis with moderate to severe central canal and moderate foraminal stenosis.  Patient is being evaluated in CPM in anticipation of left sided L4-5 Castillo psoas lumbar interbody fusion for indirect decompression using BMP with cage placement and L4-5 Instrumentation using GLOBUS Excelsius GPS Robotic Navigation Assistance with Dr. Hickey on 2-10-25.  Past Medical History:   Diagnosis Date    Anxiety     on lexapro    Arthritis     Encounter for screening, unspecified     Visit for screening    GERD (gastroesophageal reflux disease)     controlled    Herpes     Hyperlipidemia     Hypertension     Obesity     BMI 41    Overactive bladder     Spinal stenosis     Urge incontinence        Past Surgical History:   Procedure Laterality Date     SECTION, CLASSIC      x2    COLONOSCOPY  2021    KNEE SURGERY      Knee Surgery    TONSILLECTOMY      Tonsillectomy       Patient  has no history on file for sexual activity.    No family history on file.    No Known Allergies    Prior to Admission medications    Medication Sig Start Date End Date Taking? Authorizing Provider   clotrimazole-betamethasone (Lotrisone) cream APPLY TO AFFECTED AREA TWICE A DAY 24   TERRENCE Garcia-CNP   cyclobenzaprine (Flexeril) 10 mg tablet Take 1 tablet (10 mg) by mouth as needed at bedtime for muscle spasms. 11/5/24 7/3/25  Bertram Carrillo MD   escitalopram (Lexapro) 20 mg tablet Take 1 tablet (20 mg) by mouth once daily. 24   Bertram Carrillo MD   fluocinolone (Derma-Smoothe/FS Body Oil) 0.01 % external oil Apply topically 3 times a day. 24  Bertram Carrillo MD  "  metoprolol succinate XL (Toprol-XL) 50 mg 24 hr tablet Take 1 tablet (50 mg) by mouth once daily. Do not crush or chew. 11/5/24 5/4/25  Bertram Carrillo MD   nabumetone (Relafen) 750 mg tablet Take 1 tablet (750 mg) by mouth 2 times a day. 11/5/24 11/5/25  Bertram Carrillo MD   omeprazole (PriLOSEC) 40 mg DR capsule TAKE 1 CAPSULE BY MOUTH EVERY DAY 7/10/23   Bertram Carrillo MD   valACYclovir (Valtrex) 500 mg tablet TAKE 1 TABLET BY MOUTH EVERY DAY  Patient taking differently: Take 1 tablet (500 mg) by mouth if needed. 11/30/23   Bertram Carrillo MD        PAT ROS:   Constitutional:   neg    Neuro/Psych:   neg    Eyes:   neg    Ears:   neg    Nose:   neg    Mouth:   neg    Throat:   neg    Neck:   neg    Cardio:   neg    Respiratory:   neg    Endocrine:   neg    GI:   neg    :   neg    Musculoskeletal:   neg    Hematologic:   neg    Skin:  neg        Physical Exam  Vitals reviewed.   Constitutional:       Appearance: Normal appearance.   HENT:      Head: Normocephalic and atraumatic.      Nose: Nose normal.      Mouth/Throat:      Mouth: Mucous membranes are moist.   Cardiovascular:      Rate and Rhythm: Normal rate and regular rhythm.      Pulses: Normal pulses.   Pulmonary:      Effort: Pulmonary effort is normal.   Abdominal:      Palpations: Abdomen is soft.   Musculoskeletal:         General: Normal range of motion.      Cervical back: Normal range of motion.   Skin:     General: Skin is warm.   Neurological:      General: No focal deficit present.      Mental Status: She is alert and oriented to person, place, and time.   Psychiatric:         Mood and Affect: Mood normal.         Behavior: Behavior normal.          PAT AIRWAY:   Airway:     Mallampati::  II    TM distance::  >3 FB    Neck ROM::  Full  normal        Testing/Diagnostic:     Patient Specialist/PCP:     Visit Vitals  BP (!) 126/95   Pulse 78   Temp 36.3 °C (97.3 °F)   Ht 1.626 m (5' 4\")   Wt 114 kg (252 lb)   BMI 43.26 kg/m²   OB Status " Postmenopausal   Smoking Status Never   BSA 2.27 m²       DASI Risk Score      Flowsheet Row Pre-Admission Testing from 1/30/2025 in [facility] Questionnaire Series Submission from 12/12/2024 in [facility] with Generic Provider Dara   Can you take care of yourself (eat, dress, bathe, or use toilet)?  2.75 filed at 01/30/2025 1312 2.75  filed at 12/12/2024 0725   Can you walk indoors, such as around your house? 1.75 filed at 01/30/2025 1312 1.75  filed at 12/12/2024 0725   Can you walk a block or two on level ground?  0 filed at 01/30/2025 1312 0  filed at 12/12/2024 0725   Can you climb a flight of stairs or walk up a hill? 0 filed at 01/30/2025 1312 0  filed at 12/12/2024 0725   Can you run a short distance? 0 filed at 01/30/2025 1312 0  filed at 12/12/2024 0725   Can you do light work around the house like dusting or washing dishes? 2.7 filed at 01/30/2025 1312 2.7  filed at 12/12/2024 0725   Can you do moderate work around the house like vacuuming, sweeping floors or carrying groceries? 0 filed at 01/30/2025 1312 3.5  filed at 12/12/2024 0725   Can you do heavy work around the house like scrubbing floors or lifting and moving heavy furniture?  0 filed at 01/30/2025 1312 0  filed at 12/12/2024 0725   Can you do yard work like raking leaves, weeding or pushing a mower? 0 filed at 01/30/2025 1312 0  filed at 12/12/2024 0725   Can you have sexual relations? 5.25 filed at 01/30/2025 1312 5.25  filed at 12/12/2024 0725   Can you participate in moderate recreational activities like golf, bowling, dancing, doubles tennis or throwing a baseball or football? 0 filed at 01/30/2025 1312 0  filed at 12/12/2024 0725   Can you participate in strenous sports like swimming, singles tennis, football, basketball, or skiing? 0 filed at 01/30/2025 1312 0  filed at 12/12/2024 0725   DASI SCORE 12.45 filed at 01/30/2025 1312 15.95  filed at 12/12/2024 0725   METS Score (Will be calculated only when all the  questions are answered) 4.3 filed at 01/30/2025 1312 4.7  filed at 12/12/2024 0725          Caprini DVT Assessment    No data to display       Modified Frailty Index    No data to display       CHADS2 Stroke Risk  Current as of 2 days ago (Tuesday)        N/A 3 to 100%: High Risk   2 to < 3%: Medium Risk   0 to < 2%: Low Risk     Last Change: N/A          This score determines the patient's risk of having a stroke if the patient has atrial fibrillation.        This score is not applicable to this patient. Components are not calculated.          Revised Cardiac Risk Index    No data to display       Apfel Simplified Score    No data to display       Risk Analysis Index Results This Encounter    No data found in the last 10 encounters.       Stop Bang Score      Flowsheet Row Pre-Admission Testing from 1/30/2025 in [facility] Questionnaire Series Submission from 12/12/2024 in [facility] with Generic Provider Dara   Do you snore loudly? 1 filed at 01/30/2025 1312 0  filed at 12/12/2024 0725   Do you often feel tired or fatigued after your sleep? 0 filed at 01/30/2025 1312 0  filed at 12/12/2024 0725   Has anyone ever observed you stop breathing in your sleep? 0 filed at 01/30/2025 1312 0  filed at 12/12/2024 0725   Do you have or are you being treated for high blood pressure? 0 filed at 01/30/2025 1312 1  filed at 12/12/2024 0725   Recent BMI (Calculated) 43.2 filed at 01/30/2025 1312 41.5  filed at 12/12/2024 0725   Is BMI greater than 35 kg/m2? 1=Yes filed at 01/30/2025 1312 1=Yes  filed at 12/12/2024 0725   Age older than 50 years old? 1=Yes filed at 01/30/2025 1312 1=Yes  filed at 12/12/2024 0725   Is your neck circumference greater than 17 inches (Male) or 16 inches (Female)? 0 filed at 01/30/2025 1312 --   Gender - Male 0=No filed at 01/30/2025 1312 0=No  filed at 12/12/2024 0725   STOP-BANG Total Score 3 filed at 01/30/2025 1312 --          Prodigy: High Risk  Total Score: 0           ARISCAT Score for Postoperative Pulmonary Complications    No data to display       Delg[place]illo Perioperative Risk for Myocardial Infarction or Cardiac Arrest (FERMÍN)    No data to display         Assessment and Plan:     Anesthesia  The patient denies problems with anesthesia in the past such as PONV, prolonged sedation, awareness, dental damage, aspiration, cardiac arrest, difficult intubation, or unexpected hospital admissions.     Neurology  The patient has no neurological diagnoses or significant findings on chart review, clinical presentation, and evaluation. No grossly apparent neurological perioperative risk. The patient is at increased risk for perioperative stroke secondary to hyperlipidemia, female gender, general anesthesia, operative time >2.5 hours.    HEENT/Airway  No diagnoses, significant findings on chart review, clinical presentation, or evaluation. No documented or reported history of airway difficulty.     Cardiovascular  The patient is scheduled for non-cardiac surgery associated with elevated risk. The patient has no major cardiac contraindications to non- cardiac surgery.  RCRI  The patient meets 0 RCRI criteria and therefor has a 3.9% risk of major adverse cardiac complications.  METS  The patient's functional capacity is greater than 4 METS.  EKG  The patient has no EKG or echocardiographic changes concerning for myocardial ischemia.   Heart Failure  The patient has no known history of heart failure.  Additionally, the patient reports no symptoms of heart failure and demonstrates no signs of heart failure.  Hypertension Evaluation  The patient has a known history of hypertension that is controlled.  Patient's hypertension is most consistent with stage 1.  Heart Rhythm Evaluation  The patient has no history of arrhythmias.  Heart Valve Evaluation  The patient has no known history of valvular heart disease. The patient has no symptoms or physical exam findings to suggest valvular heart  disease.  Cardiology Evaluation  The patient is not followed by cardiology.    The patient has a 30-day risk for MACE of 0 predictors, 3.9% risk for cardiac death, nonfatal myocardial infarction, and nonfatal cardiac arrest.  FERMÍN score which indicates a 0.1% risk of intraoperative or 30-day postoperative MACE (major adverse cardiac event).    Pulmonary  No significant findings on chart review or clinical presentation and evaluation. The patient is at increased risk of perioperative pulmonary complications secondary to neurosurgery, morbid obesity.    The patient has a stop bang score of 3, which places patient at intermediate risk for having KOFI.    ARISCAT 26, Intermediate, 13.3% risk of in-hospital postoperative pulmonary complications  PRODIGY 0, low risk of respiratory depression episode. Patient given PI sheet for preoperative deep breathing exercises.    Hematology  No diagnoses or significant findings on chart review or clinical presentation and evaluation.  Antiplatelet management   The patient is not currently receiving antiplatelet therapy.  Anticoagulation management  The patient is not currently receiving anticoagulation therapy.    Caprini score 9, high risk of perioperative VTE.     Patient instructed to ambulate as soon as possible postoperatively to decrease thromboembolic risk. Initiate mechanical DVT prophylaxis as soon as possible and initiate chemical prophylaxis when deemed safe from a bleeding standpoint post surgery.     Transfusion Evaluation  A type and screen was obtained given the likelihood for perioperative transfusion of blood or blood products.    Gastrointestinal  The patient has GERD    Eat 10- 0,  self-perceived oropharyngeal dysphagia scale (0-40)     Genitourinary  No diagnoses or significant findings on chart review or clinical presentation and evaluation.    Renal  No renal diagnoses or significant findings on chart review or clinical presentation and evaluation. The patient  has specific risk factors associated with increased risk of perioperative renal complications related to age greater than 55, hypertension.    Musculoskeletal  The patient has osteoarthritis    Endocrine  Diabetes Evaluation  The patient has no history of diabetes mellitus.  Thyroid Disease Evaluation  The patient has no history of thyroid disease.    ID  No diagnoses or significant findings on chart review or clinical presentation and evaluation. MRSA screening obtained. Prescriptions and instructions given for Hibiclens and Peridex.    -Preoperative medication instructions were provided and reviewed with the patient.  Any additional testing or evaluation was explained to the patient.  NPO Instructions were discussed, and the patient's questions were answered prior to conclusion of this encounter. Patient verbalized understanding of preoperative instructions. After Visit Summary given.      Recent Results (from the past week)   Staphylococcus aureus/MRSA colonization, Culture    Collection Time: 01/30/25  1:54 PM    Specimen: Nares/Axilla/Groin; Swab   Result Value Ref Range    Staph/MRSA Screen Culture (A)      Isolated: Methicillin Susceptible Staphylococcus aureus (MSSA)   CBC    Collection Time: 01/30/25  1:54 PM   Result Value Ref Range    WBC 8.5 4.4 - 11.3 x10*3/uL    nRBC 0.0 0.0 - 0.0 /100 WBCs    RBC 4.74 4.00 - 5.20 x10*6/uL    Hemoglobin 14.3 12.0 - 16.0 g/dL    Hematocrit 43.1 36.0 - 46.0 %    MCV 91 80 - 100 fL    MCH 30.2 26.0 - 34.0 pg    MCHC 33.2 32.0 - 36.0 g/dL    RDW 12.4 11.5 - 14.5 %    Platelets 287 150 - 450 x10*3/uL   Basic Metabolic Panel    Collection Time: 01/30/25  1:54 PM   Result Value Ref Range    Glucose 95 74 - 99 mg/dL    Sodium 135 (L) 136 - 145 mmol/L    Potassium 4.9 3.5 - 5.3 mmol/L    Chloride 99 98 - 107 mmol/L    Bicarbonate 27 21 - 32 mmol/L    Anion Gap 14 10 - 20 mmol/L    Urea Nitrogen 16 6 - 23 mg/dL    Creatinine 0.79 0.50 - 1.05 mg/dL    eGFR 86 >60 mL/min/1.73m*2     Calcium 9.7 8.6 - 10.6 mg/dL   Type And Screen    Collection Time: 01/30/25  1:54 PM   Result Value Ref Range    ABO TYPE A     Rh TYPE POS     ANTIBODY SCREEN NEG

## 2025-02-01 LAB — STAPHYLOCOCCUS SPEC CULT: ABNORMAL

## 2025-02-05 ENCOUNTER — APPOINTMENT (OUTPATIENT)
Dept: PHYSICAL THERAPY | Facility: CLINIC | Age: 60
End: 2025-02-05

## 2025-02-07 NOTE — PROGRESS NOTES
Pharmacy Medication History Review    Kallie Dale is a 59 y.o. female who is planned to be admitted for No Principal Problem: There is no principal problem currently on the Problem List. Please update the Problem List and refresh.. Pharmacy called the patient prior to their scheduled procedure and reviewed the patient's [id number] medications for accuracy.    Medications ADDED:  none  Medications CHANGED:  none  Medications REMOVED:   none    Please review updated prior to admission medication list and comments regarding how patient may be taking medications differently by going to Admission tab --> Admission Orders --> Admit Orders / Review prior to admission medications.     Preferred pharmacy, last doses of medications, and allergies to be confirmed with patient by nursing the day of procedure.     Sources used to complete the med history include:  [facility]  Pharmacy dispense history  Patient interview  Chart Review  Care Everywhere     Below are additional concerns with the patient's PTA list.  Patient states they take valacyclovir 500mg as needed for flare-up. They are NOT currently taking at this time. L.F. 03/15/24 #90/90d    Myesha Fung [facility]  Please reach out via Secure Chat for questions

## 2025-02-08 ENCOUNTER — ANESTHESIA EVENT (OUTPATIENT)
Dept: OPERATING ROOM | Facility: HOSPITAL | Age: 60
DRG: 451 | End: 2025-02-08
Payer: COMMERCIAL

## 2025-02-10 ENCOUNTER — APPOINTMENT (OUTPATIENT)
Dept: RADIOLOGY | Facility: HOSPITAL | Age: 60
DRG: 451 | End: 2025-02-10
Payer: COMMERCIAL

## 2025-02-10 ENCOUNTER — HOSPITAL ENCOUNTER (INPATIENT)
Facility: HOSPITAL | Age: 60
LOS: 1 days | Discharge: HOME HEALTH | DRG: 451 | End: 2025-02-11
Attending: NEUROLOGICAL SURGERY | Admitting: NEUROLOGICAL SURGERY
Payer: COMMERCIAL

## 2025-02-10 ENCOUNTER — ANESTHESIA (OUTPATIENT)
Dept: OPERATING ROOM | Facility: HOSPITAL | Age: 60
DRG: 451 | End: 2025-02-10
Payer: COMMERCIAL

## 2025-02-10 DIAGNOSIS — G89.18 ACUTE POST-OPERATIVE PAIN: ICD-10-CM

## 2025-02-10 DIAGNOSIS — M48.061 LUMBAR FORAMINAL STENOSIS: ICD-10-CM

## 2025-02-10 DIAGNOSIS — Z74.09 IMPAIRED FUNCTIONAL MOBILITY, BALANCE, AND ENDURANCE: ICD-10-CM

## 2025-02-10 DIAGNOSIS — M48.062 LUMBAR STENOSIS WITH NEUROGENIC CLAUDICATION: ICD-10-CM

## 2025-02-10 DIAGNOSIS — M54.16 LUMBAR RADICULOPATHY, CHRONIC: ICD-10-CM

## 2025-02-10 DIAGNOSIS — Z98.890 S/P SPINAL SURGERY: ICD-10-CM

## 2025-02-10 DIAGNOSIS — M43.16 SPONDYLOLISTHESIS AT L4-L5 LEVEL: Primary | ICD-10-CM

## 2025-02-10 PROBLEM — E66.813 CLASS 3 SEVERE OBESITY DUE TO EXCESS CALORIES WITHOUT SERIOUS COMORBIDITY WITH BODY MASS INDEX (BMI) OF 40.0 TO 44.9 IN ADULT: Status: ACTIVE | Noted: 2025-02-10

## 2025-02-10 PROBLEM — I82.409 DVT (DEEP VENOUS THROMBOSIS) (MULTI): Status: ACTIVE | Noted: 2025-02-10

## 2025-02-10 PROBLEM — E66.01 CLASS 3 SEVERE OBESITY DUE TO EXCESS CALORIES WITHOUT SERIOUS COMORBIDITY WITH BODY MASS INDEX (BMI) OF 40.0 TO 44.9 IN ADULT: Status: ACTIVE | Noted: 2025-02-10

## 2025-02-10 LAB
ABO GROUP (TYPE) IN BLOOD: NORMAL
ANION GAP BLDA CALCULATED.4IONS-SCNC: 12 MMO/L (ref 10–25)
BASE EXCESS BLDA CALC-SCNC: -3.5 MMOL/L (ref -2–3)
BODY TEMPERATURE: 37 DEGREES CELSIUS
CA-I BLDA-SCNC: 1.14 MMOL/L (ref 1.1–1.33)
CHLORIDE BLDA-SCNC: 105 MMOL/L (ref 98–107)
GLUCOSE BLD MANUAL STRIP-MCNC: 140 MG/DL (ref 74–99)
GLUCOSE BLD MANUAL STRIP-MCNC: 176 MG/DL (ref 74–99)
GLUCOSE BLDA-MCNC: 102 MG/DL (ref 74–99)
HCO3 BLDA-SCNC: 21.5 MMOL/L (ref 22–26)
HCT VFR BLD EST: 41 % (ref 36–46)
HGB BLDA-MCNC: 13.8 G/DL (ref 12–16)
INHALED O2 CONCENTRATION: 40 %
LACTATE BLDA-SCNC: 1 MMOL/L (ref 0.4–2)
OXYHGB MFR BLDA: 97.8 % (ref 94–98)
PCO2 BLDA: 38 MM HG (ref 38–42)
PH BLDA: 7.36 PH (ref 7.38–7.42)
PO2 BLDA: 179 MM HG (ref 85–95)
POTASSIUM BLDA-SCNC: 3.8 MMOL/L (ref 3.5–5.3)
RH FACTOR (ANTIGEN D): NORMAL
SAO2 % BLDA: 100 % (ref 94–100)
SODIUM BLDA-SCNC: 135 MMOL/L (ref 136–145)

## 2025-02-10 PROCEDURE — 3600000004 HC OR TIME - INITIAL BASE CHARGE - PROCEDURE LEVEL FOUR: Performed by: NEUROLOGICAL SURGERY

## 2025-02-10 PROCEDURE — 82947 ASSAY GLUCOSE BLOOD QUANT: CPT

## 2025-02-10 PROCEDURE — 7100000011 HC EXTENDED STAY RECOVERY HOURLY - NURSING UNIT

## 2025-02-10 PROCEDURE — 22558 ARTHRD ANT NTRBD MIN DSC LUM: CPT | Performed by: NEUROLOGICAL SURGERY

## 2025-02-10 PROCEDURE — 97530 THERAPEUTIC ACTIVITIES: CPT | Mod: GP

## 2025-02-10 PROCEDURE — 2780000003 HC OR 278 NO HCPCS: Performed by: NEUROLOGICAL SURGERY

## 2025-02-10 PROCEDURE — 0ST20ZZ RESECTION OF LUMBAR VERTEBRAL DISC, OPEN APPROACH: ICD-10-PCS | Performed by: NEUROLOGICAL SURGERY

## 2025-02-10 PROCEDURE — 0SG00A0 FUSION OF LUMBAR VERTEBRAL JOINT WITH INTERBODY FUSION DEVICE, ANTERIOR APPROACH, ANTERIOR COLUMN, OPEN APPROACH: ICD-10-PCS | Performed by: NEUROLOGICAL SURGERY

## 2025-02-10 PROCEDURE — 84132 ASSAY OF SERUM POTASSIUM: CPT | Performed by: ANESTHESIOLOGIST ASSISTANT

## 2025-02-10 PROCEDURE — 3700000001 HC GENERAL ANESTHESIA TIME - INITIAL BASE CHARGE: Performed by: NEUROLOGICAL SURGERY

## 2025-02-10 PROCEDURE — 7100000002 HC RECOVERY ROOM TIME - EACH INCREMENTAL 1 MINUTE: Performed by: NEUROLOGICAL SURGERY

## 2025-02-10 PROCEDURE — 61783 SCAN PROC SPINAL: CPT | Performed by: NEUROLOGICAL SURGERY

## 2025-02-10 PROCEDURE — 2500000004 HC RX 250 GENERAL PHARMACY W/ HCPCS (ALT 636 FOR OP/ED): Performed by: ANESTHESIOLOGIST ASSISTANT

## 2025-02-10 PROCEDURE — 2500000005 HC RX 250 GENERAL PHARMACY W/O HCPCS: Performed by: NEUROLOGICAL SURGERY

## 2025-02-10 PROCEDURE — 36620 INSERTION CATHETER ARTERY: CPT | Performed by: ANESTHESIOLOGY

## 2025-02-10 PROCEDURE — 1100000001 HC PRIVATE ROOM DAILY

## 2025-02-10 PROCEDURE — 7100000001 HC RECOVERY ROOM TIME - INITIAL BASE CHARGE: Performed by: NEUROLOGICAL SURGERY

## 2025-02-10 PROCEDURE — 2500000004 HC RX 250 GENERAL PHARMACY W/ HCPCS (ALT 636 FOR OP/ED): Performed by: STUDENT IN AN ORGANIZED HEALTH CARE EDUCATION/TRAINING PROGRAM

## 2025-02-10 PROCEDURE — 3E0U0GB INTRODUCTION OF RECOMBINANT BONE MORPHOGENETIC PROTEIN INTO JOINTS, OPEN APPROACH: ICD-10-PCS | Performed by: NEUROLOGICAL SURGERY

## 2025-02-10 PROCEDURE — 2500000001 HC RX 250 WO HCPCS SELF ADMINISTERED DRUGS (ALT 637 FOR MEDICARE OP): Performed by: STUDENT IN AN ORGANIZED HEALTH CARE EDUCATION/TRAINING PROGRAM

## 2025-02-10 PROCEDURE — A22558 PR ARTHRODESIS ANT INTERBODY MIN DISCECTOMY,LUMBAR: Performed by: ANESTHESIOLOGIST ASSISTANT

## 2025-02-10 PROCEDURE — 22840 INSERT SPINE FIXATION DEVICE: CPT | Performed by: NEUROLOGICAL SURGERY

## 2025-02-10 PROCEDURE — C1821 INTERSPINOUS IMPLANT: HCPCS | Performed by: NEUROLOGICAL SURGERY

## 2025-02-10 PROCEDURE — 2720000007 HC OR 272 NO HCPCS: Performed by: NEUROLOGICAL SURGERY

## 2025-02-10 PROCEDURE — 8E0W0CZ ROBOTIC ASSISTED PROCEDURE OF TRUNK REGION, OPEN APPROACH: ICD-10-PCS | Performed by: NEUROLOGICAL SURGERY

## 2025-02-10 PROCEDURE — 3700000002 HC GENERAL ANESTHESIA TIME - EACH INCREMENTAL 1 MINUTE: Performed by: NEUROLOGICAL SURGERY

## 2025-02-10 PROCEDURE — 2500000004 HC RX 250 GENERAL PHARMACY W/ HCPCS (ALT 636 FOR OP/ED): Performed by: NEUROLOGICAL SURGERY

## 2025-02-10 PROCEDURE — 2500000005 HC RX 250 GENERAL PHARMACY W/O HCPCS: Performed by: ANESTHESIOLOGY

## 2025-02-10 PROCEDURE — 2500000005 HC RX 250 GENERAL PHARMACY W/O HCPCS: Performed by: ANESTHESIOLOGIST ASSISTANT

## 2025-02-10 PROCEDURE — 3600000009 HC OR TIME - EACH INCREMENTAL 1 MINUTE - PROCEDURE LEVEL FOUR: Performed by: NEUROLOGICAL SURGERY

## 2025-02-10 PROCEDURE — A22558 PR ARTHRODESIS ANT INTERBODY MIN DISCECTOMY,LUMBAR: Performed by: ANESTHESIOLOGY

## 2025-02-10 PROCEDURE — 8E0WXBZ COMPUTER ASSISTED PROCEDURE OF TRUNK REGION: ICD-10-PCS | Performed by: NEUROLOGICAL SURGERY

## 2025-02-10 PROCEDURE — 97161 PT EVAL LOW COMPLEX 20 MIN: CPT | Mod: GP

## 2025-02-10 PROCEDURE — C1713 ANCHOR/SCREW BN/BN,TIS/BN: HCPCS | Performed by: NEUROLOGICAL SURGERY

## 2025-02-10 PROCEDURE — 22853 INSJ BIOMECHANICAL DEVICE: CPT | Performed by: NEUROLOGICAL SURGERY

## 2025-02-10 DEVICE — IMPLANTABLE DEVICE: Type: IMPLANTABLE DEVICE | Site: SPINE LUMBAR | Status: FUNCTIONAL

## 2025-02-10 DEVICE — SPACER, MODULUS XLW, 10X22X55MM, 10 DEG: Type: IMPLANTABLE DEVICE | Site: SPINE LUMBAR | Status: FUNCTIONAL

## 2025-02-10 DEVICE — CAP, LOCKING, CREO MIS: Type: IMPLANTABLE DEVICE | Site: SPINE LUMBAR | Status: FUNCTIONAL

## 2025-02-10 DEVICE — IMPLANTABLE DEVICE: Type: IMPLANTABLE DEVICE | Site: SPINE LUMBAR | Status: NON-FUNCTIONAL

## 2025-02-10 DEVICE — TULIP, POLYAXIAL, 30MM, CREO MIS, REDUCTION: Type: IMPLANTABLE DEVICE | Site: SPINE LUMBAR | Status: FUNCTIONAL

## 2025-02-10 RX ORDER — HYDRALAZINE HYDROCHLORIDE 20 MG/ML
5 INJECTION INTRAMUSCULAR; INTRAVENOUS EVERY 10 MIN PRN
Status: DISCONTINUED | OUTPATIENT
Start: 2025-02-10 | End: 2025-02-10 | Stop reason: HOSPADM

## 2025-02-10 RX ORDER — NALOXONE HYDROCHLORIDE 0.4 MG/ML
0.2 INJECTION, SOLUTION INTRAMUSCULAR; INTRAVENOUS; SUBCUTANEOUS EVERY 5 MIN PRN
Status: DISCONTINUED | OUTPATIENT
Start: 2025-02-10 | End: 2025-02-11 | Stop reason: HOSPADM

## 2025-02-10 RX ORDER — OXYCODONE HYDROCHLORIDE 5 MG/1
5 TABLET ORAL EVERY 4 HOURS PRN
Status: DISCONTINUED | OUTPATIENT
Start: 2025-02-10 | End: 2025-02-11 | Stop reason: HOSPADM

## 2025-02-10 RX ORDER — SCOPOLAMINE 1 MG/3D
1 PATCH, EXTENDED RELEASE TRANSDERMAL
Status: DISCONTINUED | OUTPATIENT
Start: 2025-02-10 | End: 2025-02-11 | Stop reason: HOSPADM

## 2025-02-10 RX ORDER — DEXTROSE 50 % IN WATER (D50W) INTRAVENOUS SYRINGE
12.5
Status: DISCONTINUED | OUTPATIENT
Start: 2025-02-10 | End: 2025-02-11 | Stop reason: HOSPADM

## 2025-02-10 RX ORDER — ALBUTEROL SULFATE 0.83 MG/ML
2.5 SOLUTION RESPIRATORY (INHALATION) ONCE AS NEEDED
Status: DISCONTINUED | OUTPATIENT
Start: 2025-02-10 | End: 2025-02-10 | Stop reason: HOSPADM

## 2025-02-10 RX ORDER — GLYCOPYRROLATE 0.2 MG/ML
INJECTION INTRAMUSCULAR; INTRAVENOUS AS NEEDED
Status: DISCONTINUED | OUTPATIENT
Start: 2025-02-10 | End: 2025-02-10

## 2025-02-10 RX ORDER — OXYCODONE AND ACETAMINOPHEN 5; 325 MG/1; MG/1
1 TABLET ORAL EVERY 4 HOURS PRN
Status: DISCONTINUED | OUTPATIENT
Start: 2025-02-10 | End: 2025-02-10 | Stop reason: HOSPADM

## 2025-02-10 RX ORDER — SODIUM CHLORIDE 0.9 G/100ML
INJECTION, SOLUTION IRRIGATION AS NEEDED
Status: DISCONTINUED | OUTPATIENT
Start: 2025-02-10 | End: 2025-02-10 | Stop reason: HOSPADM

## 2025-02-10 RX ORDER — LABETALOL HYDROCHLORIDE 5 MG/ML
INJECTION, SOLUTION INTRAVENOUS AS NEEDED
Status: DISCONTINUED | OUTPATIENT
Start: 2025-02-10 | End: 2025-02-10

## 2025-02-10 RX ORDER — ACETAMINOPHEN 325 MG/1
650 TABLET ORAL EVERY 4 HOURS PRN
Status: DISCONTINUED | OUTPATIENT
Start: 2025-02-10 | End: 2025-02-10 | Stop reason: HOSPADM

## 2025-02-10 RX ORDER — MIDAZOLAM HYDROCHLORIDE 1 MG/ML
INJECTION INTRAMUSCULAR; INTRAVENOUS AS NEEDED
Status: DISCONTINUED | OUTPATIENT
Start: 2025-02-10 | End: 2025-02-10

## 2025-02-10 RX ORDER — ONDANSETRON 4 MG/1
4 TABLET, FILM COATED ORAL EVERY 8 HOURS PRN
Status: DISCONTINUED | OUTPATIENT
Start: 2025-02-10 | End: 2025-02-11 | Stop reason: HOSPADM

## 2025-02-10 RX ORDER — OXYCODONE HYDROCHLORIDE 5 MG/1
10 TABLET ORAL EVERY 4 HOURS PRN
Status: DISCONTINUED | OUTPATIENT
Start: 2025-02-10 | End: 2025-02-10 | Stop reason: HOSPADM

## 2025-02-10 RX ORDER — HYDROMORPHONE HYDROCHLORIDE 1 MG/ML
INJECTION, SOLUTION INTRAMUSCULAR; INTRAVENOUS; SUBCUTANEOUS AS NEEDED
Status: DISCONTINUED | OUTPATIENT
Start: 2025-02-10 | End: 2025-02-10

## 2025-02-10 RX ORDER — LIDOCAINE HYDROCHLORIDE 20 MG/ML
INJECTION, SOLUTION INFILTRATION; PERINEURAL AS NEEDED
Status: DISCONTINUED | OUTPATIENT
Start: 2025-02-10 | End: 2025-02-10

## 2025-02-10 RX ORDER — DROPERIDOL 2.5 MG/ML
0.62 INJECTION, SOLUTION INTRAMUSCULAR; INTRAVENOUS ONCE AS NEEDED
Status: DISCONTINUED | OUTPATIENT
Start: 2025-02-10 | End: 2025-02-10 | Stop reason: HOSPADM

## 2025-02-10 RX ORDER — PHENYLEPHRINE HCL IN 0.9% NACL 0.4MG/10ML
SYRINGE (ML) INTRAVENOUS AS NEEDED
Status: DISCONTINUED | OUTPATIENT
Start: 2025-02-10 | End: 2025-02-10

## 2025-02-10 RX ORDER — LIDOCAINE HYDROCHLORIDE 10 MG/ML
0.1 INJECTION, SOLUTION INFILTRATION; PERINEURAL ONCE
Status: DISCONTINUED | OUTPATIENT
Start: 2025-02-10 | End: 2025-02-10 | Stop reason: HOSPADM

## 2025-02-10 RX ORDER — OXYCODONE HYDROCHLORIDE 5 MG/1
10 TABLET ORAL EVERY 4 HOURS PRN
Status: DISCONTINUED | OUTPATIENT
Start: 2025-02-10 | End: 2025-02-11 | Stop reason: HOSPADM

## 2025-02-10 RX ORDER — METOCLOPRAMIDE HYDROCHLORIDE 5 MG/ML
10 INJECTION INTRAMUSCULAR; INTRAVENOUS ONCE AS NEEDED
Status: DISCONTINUED | OUTPATIENT
Start: 2025-02-10 | End: 2025-02-10 | Stop reason: HOSPADM

## 2025-02-10 RX ORDER — DEXTROSE 50 % IN WATER (D50W) INTRAVENOUS SYRINGE
25
Status: DISCONTINUED | OUTPATIENT
Start: 2025-02-10 | End: 2025-02-11 | Stop reason: HOSPADM

## 2025-02-10 RX ORDER — ONDANSETRON HYDROCHLORIDE 2 MG/ML
INJECTION, SOLUTION INTRAVENOUS AS NEEDED
Status: DISCONTINUED | OUTPATIENT
Start: 2025-02-10 | End: 2025-02-10

## 2025-02-10 RX ORDER — ONDANSETRON HYDROCHLORIDE 2 MG/ML
4 INJECTION, SOLUTION INTRAVENOUS EVERY 8 HOURS PRN
Status: DISCONTINUED | OUTPATIENT
Start: 2025-02-10 | End: 2025-02-11 | Stop reason: HOSPADM

## 2025-02-10 RX ORDER — AMOXICILLIN 250 MG
2 CAPSULE ORAL 2 TIMES DAILY
Status: DISCONTINUED | OUTPATIENT
Start: 2025-02-10 | End: 2025-02-11 | Stop reason: HOSPADM

## 2025-02-10 RX ORDER — ESCITALOPRAM OXALATE 20 MG/1
20 TABLET ORAL DAILY
Status: DISCONTINUED | OUTPATIENT
Start: 2025-02-11 | End: 2025-02-11 | Stop reason: HOSPADM

## 2025-02-10 RX ORDER — LABETALOL HYDROCHLORIDE 5 MG/ML
5 INJECTION, SOLUTION INTRAVENOUS EVERY 10 MIN PRN
Status: DISCONTINUED | OUTPATIENT
Start: 2025-02-10 | End: 2025-02-10 | Stop reason: HOSPADM

## 2025-02-10 RX ORDER — HYDROMORPHONE HYDROCHLORIDE 0.2 MG/ML
0.2 INJECTION INTRAMUSCULAR; INTRAVENOUS; SUBCUTANEOUS EVERY 5 MIN PRN
Status: DISCONTINUED | OUTPATIENT
Start: 2025-02-10 | End: 2025-02-10 | Stop reason: HOSPADM

## 2025-02-10 RX ORDER — PROPOFOL 10 MG/ML
INJECTION, EMULSION INTRAVENOUS AS NEEDED
Status: DISCONTINUED | OUTPATIENT
Start: 2025-02-10 | End: 2025-02-10

## 2025-02-10 RX ORDER — REMIFENTANIL HYDROCHLORIDE 1 MG/ML
INJECTION, POWDER, LYOPHILIZED, FOR SOLUTION INTRAVENOUS CONTINUOUS PRN
Status: DISCONTINUED | OUTPATIENT
Start: 2025-02-10 | End: 2025-02-10

## 2025-02-10 RX ORDER — FENTANYL CITRATE 50 UG/ML
INJECTION, SOLUTION INTRAMUSCULAR; INTRAVENOUS AS NEEDED
Status: DISCONTINUED | OUTPATIENT
Start: 2025-02-10 | End: 2025-02-10

## 2025-02-10 RX ORDER — SODIUM CHLORIDE, SODIUM LACTATE, POTASSIUM CHLORIDE, CALCIUM CHLORIDE 600; 310; 30; 20 MG/100ML; MG/100ML; MG/100ML; MG/100ML
INJECTION, SOLUTION INTRAVENOUS CONTINUOUS PRN
Status: DISCONTINUED | OUTPATIENT
Start: 2025-02-10 | End: 2025-02-10

## 2025-02-10 RX ORDER — HEPARIN SODIUM 5000 [USP'U]/ML
7500 INJECTION, SOLUTION INTRAVENOUS; SUBCUTANEOUS EVERY 8 HOURS SCHEDULED
Status: DISCONTINUED | OUTPATIENT
Start: 2025-02-11 | End: 2025-02-11 | Stop reason: HOSPADM

## 2025-02-10 RX ORDER — SODIUM CHLORIDE, SODIUM LACTATE, POTASSIUM CHLORIDE, CALCIUM CHLORIDE 600; 310; 30; 20 MG/100ML; MG/100ML; MG/100ML; MG/100ML
50 INJECTION, SOLUTION INTRAVENOUS CONTINUOUS
Status: DISCONTINUED | OUTPATIENT
Start: 2025-02-10 | End: 2025-02-10 | Stop reason: HOSPADM

## 2025-02-10 RX ORDER — NORETHINDRONE AND ETHINYL ESTRADIOL 0.5-0.035
KIT ORAL AS NEEDED
Status: DISCONTINUED | OUTPATIENT
Start: 2025-02-10 | End: 2025-02-10

## 2025-02-10 RX ORDER — SUCCINYLCHOLINE CHLORIDE 20 MG/ML
INJECTION INTRAMUSCULAR; INTRAVENOUS AS NEEDED
Status: DISCONTINUED | OUTPATIENT
Start: 2025-02-10 | End: 2025-02-10

## 2025-02-10 RX ORDER — POLYETHYLENE GLYCOL 3350 17 G/17G
17 POWDER, FOR SOLUTION ORAL 2 TIMES DAILY
Status: DISCONTINUED | OUTPATIENT
Start: 2025-02-10 | End: 2025-02-11 | Stop reason: HOSPADM

## 2025-02-10 RX ORDER — ACETAMINOPHEN 325 MG/1
650 TABLET ORAL EVERY 6 HOURS
Status: DISCONTINUED | OUTPATIENT
Start: 2025-02-10 | End: 2025-02-11 | Stop reason: HOSPADM

## 2025-02-10 RX ORDER — CYCLOBENZAPRINE HCL 10 MG
10 TABLET ORAL 3 TIMES DAILY
Status: DISCONTINUED | OUTPATIENT
Start: 2025-02-10 | End: 2025-02-11 | Stop reason: HOSPADM

## 2025-02-10 RX ORDER — SODIUM CHLORIDE 9 MG/ML
100 INJECTION, SOLUTION INTRAVENOUS CONTINUOUS
Status: DISCONTINUED | OUTPATIENT
Start: 2025-02-10 | End: 2025-02-11 | Stop reason: HOSPADM

## 2025-02-10 RX ORDER — METOPROLOL SUCCINATE 50 MG/1
50 TABLET, EXTENDED RELEASE ORAL DAILY
Status: DISCONTINUED | OUTPATIENT
Start: 2025-02-11 | End: 2025-02-11 | Stop reason: HOSPADM

## 2025-02-10 RX ORDER — CEFAZOLIN 1 G/1
INJECTION, POWDER, FOR SOLUTION INTRAVENOUS AS NEEDED
Status: DISCONTINUED | OUTPATIENT
Start: 2025-02-10 | End: 2025-02-10

## 2025-02-10 RX ORDER — FENTANYL CITRATE 50 UG/ML
12.5 INJECTION, SOLUTION INTRAMUSCULAR; INTRAVENOUS EVERY 5 MIN PRN
Status: DISCONTINUED | OUTPATIENT
Start: 2025-02-10 | End: 2025-02-10 | Stop reason: HOSPADM

## 2025-02-10 RX ORDER — PHENYLEPHRINE 10 MG/250 ML(40 MCG/ML)IN 0.9 % SOD.CHLORIDE INTRAVENOUS
CONTINUOUS PRN
Status: DISCONTINUED | OUTPATIENT
Start: 2025-02-10 | End: 2025-02-10

## 2025-02-10 RX ADMIN — CEFAZOLIN 2 G: 1 INJECTION, POWDER, FOR SOLUTION INTRAMUSCULAR; INTRAVENOUS at 08:21

## 2025-02-10 RX ADMIN — LIDOCAINE HYDROCHLORIDE 100 MG: 20 INJECTION, SOLUTION INFILTRATION; PERINEURAL at 07:39

## 2025-02-10 RX ADMIN — Medication 40 MCG: at 08:19

## 2025-02-10 RX ADMIN — CYCLOBENZAPRINE 10 MG: 10 TABLET, FILM COATED ORAL at 15:52

## 2025-02-10 RX ADMIN — OXYCODONE 10 MG: 5 TABLET ORAL at 15:52

## 2025-02-10 RX ADMIN — HYDROMORPHONE HYDROCHLORIDE 0.2 MG: 1 INJECTION, SOLUTION INTRAMUSCULAR; INTRAVENOUS; SUBCUTANEOUS at 12:52

## 2025-02-10 RX ADMIN — SODIUM CHLORIDE 100 ML/HR: 9 INJECTION, SOLUTION INTRAVENOUS at 15:53

## 2025-02-10 RX ADMIN — SODIUM CHLORIDE, POTASSIUM CHLORIDE, SODIUM LACTATE AND CALCIUM CHLORIDE: 600; 310; 30; 20 INJECTION, SOLUTION INTRAVENOUS at 12:16

## 2025-02-10 RX ADMIN — ACETAMINOPHEN 650 MG: 325 TABLET ORAL at 15:53

## 2025-02-10 RX ADMIN — EPHEDRINE SULFATE 5 MG: 50 INJECTION, SOLUTION INTRAVENOUS at 08:19

## 2025-02-10 RX ADMIN — CEFAZOLIN 2 G: 1 INJECTION, POWDER, FOR SOLUTION INTRAMUSCULAR; INTRAVENOUS at 12:05

## 2025-02-10 RX ADMIN — PROPOFOL 30 MG: 10 INJECTION, EMULSION INTRAVENOUS at 12:44

## 2025-02-10 RX ADMIN — HYDROMORPHONE HYDROCHLORIDE 0.2 MG: 1 INJECTION, SOLUTION INTRAMUSCULAR; INTRAVENOUS; SUBCUTANEOUS at 11:33

## 2025-02-10 RX ADMIN — SENNOSIDES AND DOCUSATE SODIUM 2 TABLET: 50; 8.6 TABLET ORAL at 20:27

## 2025-02-10 RX ADMIN — FENTANYL CITRATE 25 MCG: 50 INJECTION, SOLUTION INTRAMUSCULAR; INTRAVENOUS at 11:18

## 2025-02-10 RX ADMIN — EPHEDRINE SULFATE 10 MG: 50 INJECTION, SOLUTION INTRAVENOUS at 07:56

## 2025-02-10 RX ADMIN — PROPOFOL 120 MG: 10 INJECTION, EMULSION INTRAVENOUS at 07:40

## 2025-02-10 RX ADMIN — REMIFENTANIL HYDROCHLORIDE 0.05 MCG/KG/MIN: 1 INJECTION, POWDER, LYOPHILIZED, FOR SOLUTION INTRAVENOUS at 07:41

## 2025-02-10 RX ADMIN — Medication 40 MCG: at 08:03

## 2025-02-10 RX ADMIN — PHENYLEPHRINE-NACL IV SOLUTION 10 MG/250ML-0.9% 0.2 MCG/KG/MIN: 10-0.9/25 SOLUTION at 08:29

## 2025-02-10 RX ADMIN — OXYCODONE 10 MG: 5 TABLET ORAL at 20:26

## 2025-02-10 RX ADMIN — Medication 80 MCG: at 08:27

## 2025-02-10 RX ADMIN — CYCLOBENZAPRINE 10 MG: 10 TABLET, FILM COATED ORAL at 20:27

## 2025-02-10 RX ADMIN — EPHEDRINE SULFATE 10 MG: 50 INJECTION, SOLUTION INTRAVENOUS at 07:52

## 2025-02-10 RX ADMIN — GLYCOPYRROLATE 0.1 MG: 0.2 INJECTION, SOLUTION INTRAMUSCULAR; INTRAVENOUS at 07:27

## 2025-02-10 RX ADMIN — ONDANSETRON 4 MG: 2 INJECTION INTRAMUSCULAR; INTRAVENOUS at 12:02

## 2025-02-10 RX ADMIN — SCOPOLAMINE 1 PATCH: 1.5 PATCH, EXTENDED RELEASE TRANSDERMAL at 15:52

## 2025-02-10 RX ADMIN — EPHEDRINE SULFATE 5 MG: 50 INJECTION, SOLUTION INTRAVENOUS at 09:21

## 2025-02-10 RX ADMIN — DEXAMETHASONE SODIUM PHOSPHATE 4 MG: 4 INJECTION INTRA-ARTICULAR; INTRALESIONAL; INTRAMUSCULAR; INTRAVENOUS; SOFT TISSUE at 07:40

## 2025-02-10 RX ADMIN — EPHEDRINE SULFATE 15 MG: 50 INJECTION, SOLUTION INTRAVENOUS at 07:59

## 2025-02-10 RX ADMIN — Medication 80 MCG: at 08:24

## 2025-02-10 RX ADMIN — HYDROMORPHONE HYDROCHLORIDE 0.2 MG: 1 INJECTION, SOLUTION INTRAMUSCULAR; INTRAVENOUS; SUBCUTANEOUS at 11:45

## 2025-02-10 RX ADMIN — LABETALOL HYDROCHLORIDE 5 MG: 5 INJECTION INTRAVENOUS at 12:45

## 2025-02-10 RX ADMIN — HYDROMORPHONE HYDROCHLORIDE 0.2 MG: 1 INJECTION, SOLUTION INTRAMUSCULAR; INTRAVENOUS; SUBCUTANEOUS at 12:09

## 2025-02-10 RX ADMIN — FENTANYL CITRATE 50 MCG: 50 INJECTION, SOLUTION INTRAMUSCULAR; INTRAVENOUS at 07:41

## 2025-02-10 RX ADMIN — SUCCINYLCHOLINE CHLORIDE 80 MG: 20 INJECTION, SOLUTION INTRAMUSCULAR; INTRAVENOUS at 07:41

## 2025-02-10 RX ADMIN — Medication 2 L/MIN: at 12:56

## 2025-02-10 RX ADMIN — MIDAZOLAM HYDROCHLORIDE 1 MG: 1 INJECTION, SOLUTION INTRAMUSCULAR; INTRAVENOUS at 07:27

## 2025-02-10 RX ADMIN — GLYCOPYRROLATE 0.1 MG: 0.2 INJECTION, SOLUTION INTRAMUSCULAR; INTRAVENOUS at 07:35

## 2025-02-10 RX ADMIN — SODIUM CHLORIDE, POTASSIUM CHLORIDE, SODIUM LACTATE AND CALCIUM CHLORIDE: 600; 310; 30; 20 INJECTION, SOLUTION INTRAVENOUS at 07:26

## 2025-02-10 RX ADMIN — PROPOFOL 20 MG: 10 INJECTION, EMULSION INTRAVENOUS at 07:48

## 2025-02-10 RX ADMIN — HYDROMORPHONE HYDROCHLORIDE 0.2 MG: 1 INJECTION, SOLUTION INTRAMUSCULAR; INTRAVENOUS; SUBCUTANEOUS at 12:45

## 2025-02-10 RX ADMIN — PROPOFOL 30 MG: 10 INJECTION, EMULSION INTRAVENOUS at 07:45

## 2025-02-10 RX ADMIN — FENTANYL CITRATE 25 MCG: 50 INJECTION, SOLUTION INTRAMUSCULAR; INTRAVENOUS at 11:13

## 2025-02-10 RX ADMIN — MIDAZOLAM HYDROCHLORIDE 1 MG: 1 INJECTION, SOLUTION INTRAMUSCULAR; INTRAVENOUS at 07:32

## 2025-02-10 RX ADMIN — ACETAMINOPHEN 650 MG: 325 TABLET ORAL at 20:26

## 2025-02-10 RX ADMIN — LABETALOL HYDROCHLORIDE 5 MG: 5 INJECTION INTRAVENOUS at 12:52

## 2025-02-10 RX ADMIN — EPHEDRINE SULFATE 5 MG: 50 INJECTION, SOLUTION INTRAVENOUS at 09:19

## 2025-02-10 RX ADMIN — GLYCOPYRROLATE 0.2 MG: 0.2 INJECTION, SOLUTION INTRAMUSCULAR; INTRAVENOUS at 07:57

## 2025-02-10 SDOH — SOCIAL STABILITY: SOCIAL INSECURITY: DOES ANYONE TRY TO KEEP YOU FROM HAVING/CONTACTING OTHER FRIENDS OR DOING THINGS OUTSIDE YOUR HOME?: NO

## 2025-02-10 SDOH — ECONOMIC STABILITY: INCOME INSECURITY: IN THE PAST 12 MONTHS HAS THE ELECTRIC, GAS, OIL, OR WATER COMPANY THREATENED TO SHUT OFF SERVICES IN YOUR HOME?: NO

## 2025-02-10 SDOH — HEALTH STABILITY: PHYSICAL HEALTH: ON AVERAGE, HOW MANY MINUTES DO YOU ENGAGE IN EXERCISE AT THIS LEVEL?: 30 MIN

## 2025-02-10 SDOH — SOCIAL STABILITY: SOCIAL INSECURITY: ARE THERE ANY APPARENT SIGNS OF INJURIES/BEHAVIORS THAT COULD BE RELATED TO ABUSE/NEGLECT?: NO

## 2025-02-10 SDOH — SOCIAL STABILITY: SOCIAL INSECURITY: HAVE YOU HAD ANY THOUGHTS OF HARMING ANYONE ELSE?: NO

## 2025-02-10 SDOH — HEALTH STABILITY: MENTAL HEALTH
DO YOU FEEL STRESS - TENSE, RESTLESS, NERVOUS, OR ANXIOUS, OR UNABLE TO SLEEP AT NIGHT BECAUSE YOUR MIND IS TROUBLED ALL THE TIME - THESE DAYS?: NOT AT ALL

## 2025-02-10 SDOH — ECONOMIC STABILITY: HOUSING INSECURITY: DO YOU FEEL UNSAFE GOING BACK TO THE PLACE WHERE YOU LIVE?: NO

## 2025-02-10 SDOH — ECONOMIC STABILITY: FOOD INSECURITY: WITHIN THE PAST 12 MONTHS, THE FOOD YOU BOUGHT JUST DIDN'T LAST AND YOU DIDN'T HAVE MONEY TO GET MORE.: NEVER TRUE

## 2025-02-10 SDOH — SOCIAL STABILITY: SOCIAL INSECURITY: ARE YOU MARRIED, WIDOWED, DIVORCED, SEPARATED, NEVER MARRIED, OR LIVING WITH A PARTNER?: PATIENT DECLINED

## 2025-02-10 SDOH — SOCIAL STABILITY: SOCIAL INSECURITY
WITHIN THE LAST YEAR, HAVE YOU BEEN KICKED, HIT, SLAPPED, OR OTHERWISE PHYSICALLY HURT BY YOUR PARTNER OR EX-PARTNER?: NO

## 2025-02-10 SDOH — SOCIAL STABILITY: SOCIAL INSECURITY: HAVE YOU HAD THOUGHTS OF HARMING ANYONE ELSE?: NO

## 2025-02-10 SDOH — ECONOMIC STABILITY: FOOD INSECURITY: WITHIN THE PAST 12 MONTHS, YOU WORRIED THAT YOUR FOOD WOULD RUN OUT BEFORE YOU GOT THE MONEY TO BUY MORE.: NEVER TRUE

## 2025-02-10 SDOH — SOCIAL STABILITY: SOCIAL NETWORK
DO YOU BELONG TO ANY CLUBS OR ORGANIZATIONS SUCH AS CHURCH GROUPS, UNIONS, FRATERNAL OR ATHLETIC GROUPS, OR SCHOOL GROUPS?: YES

## 2025-02-10 SDOH — SOCIAL STABILITY: SOCIAL INSECURITY: WITHIN THE LAST YEAR, HAVE YOU BEEN HUMILIATED OR EMOTIONALLY ABUSED IN OTHER WAYS BY YOUR PARTNER OR EX-PARTNER?: NO

## 2025-02-10 SDOH — SOCIAL STABILITY: SOCIAL INSECURITY
WITHIN THE LAST YEAR, HAVE YOU BEEN RAPED OR FORCED TO HAVE ANY KIND OF SEXUAL ACTIVITY BY YOUR PARTNER OR EX-PARTNER?: NO

## 2025-02-10 SDOH — HEALTH STABILITY: PHYSICAL HEALTH: ON AVERAGE, HOW MANY DAYS PER WEEK DO YOU ENGAGE IN MODERATE TO STRENUOUS EXERCISE (LIKE A BRISK WALK)?: 2 DAYS

## 2025-02-10 SDOH — SOCIAL STABILITY: SOCIAL INSECURITY: WITHIN THE LAST YEAR, HAVE YOU BEEN AFRAID OF YOUR PARTNER OR EX-PARTNER?: NO

## 2025-02-10 SDOH — SOCIAL STABILITY: SOCIAL INSECURITY: ABUSE: ADULT

## 2025-02-10 SDOH — SOCIAL STABILITY: SOCIAL INSECURITY: DO YOU FEEL ANYONE HAS EXPLOITED OR TAKEN ADVANTAGE OF YOU FINANCIALLY OR OF YOUR PERSONAL PROPERTY?: NO

## 2025-02-10 SDOH — HEALTH STABILITY: PHYSICAL HEALTH
HOW OFTEN DO YOU NEED TO HAVE SOMEONE HELP YOU WHEN YOU READ INSTRUCTIONS, PAMPHLETS, OR OTHER WRITTEN MATERIAL FROM YOUR DOCTOR OR PHARMACY?: NEVER

## 2025-02-10 SDOH — SOCIAL STABILITY: SOCIAL INSECURITY: ARE YOU OR HAVE YOU BEEN THREATENED OR ABUSED PHYSICALLY, EMOTIONALLY, OR SEXUALLY BY ANYONE?: NO

## 2025-02-10 SDOH — SOCIAL STABILITY: SOCIAL INSECURITY: DO YOU FEEL UNSAFE GOING BACK TO THE PLACE WHERE YOU ARE LIVING?: NO

## 2025-02-10 SDOH — SOCIAL STABILITY: SOCIAL NETWORK: HOW OFTEN DO YOU ATTEND CHURCH OR RELIGIOUS SERVICES?: MORE THAN 4 TIMES PER YEAR

## 2025-02-10 SDOH — SOCIAL STABILITY: SOCIAL INSECURITY: WERE YOU ABLE TO COMPLETE ALL THE BEHAVIORAL HEALTH SCREENINGS?: YES

## 2025-02-10 SDOH — HEALTH STABILITY: MENTAL HEALTH: CURRENT SMOKER: 0

## 2025-02-10 SDOH — SOCIAL STABILITY: SOCIAL NETWORK: HOW OFTEN DO YOU ATTEND MEETINGS OF THE CLUBS OR ORGANIZATIONS YOU BELONG TO?: 1 TO 4 TIMES PER YEAR

## 2025-02-10 SDOH — SOCIAL STABILITY: SOCIAL NETWORK: IN A TYPICAL WEEK, HOW MANY TIMES DO YOU TALK ON THE PHONE WITH FAMILY, FRIENDS, OR NEIGHBORS?: THREE TIMES A WEEK

## 2025-02-10 SDOH — SOCIAL STABILITY: SOCIAL INSECURITY: HAS ANYONE EVER THREATENED TO HURT YOUR FAMILY OR YOUR PETS?: NO

## 2025-02-10 SDOH — SOCIAL STABILITY: SOCIAL NETWORK: HOW OFTEN DO YOU GET TOGETHER WITH FRIENDS OR RELATIVES?: THREE TIMES A WEEK

## 2025-02-10 ASSESSMENT — PAIN - FUNCTIONAL ASSESSMENT
PAIN_FUNCTIONAL_ASSESSMENT: 0-10
PAIN_FUNCTIONAL_ASSESSMENT: UNABLE TO SELF-REPORT
PAIN_FUNCTIONAL_ASSESSMENT: 0-10

## 2025-02-10 ASSESSMENT — COGNITIVE AND FUNCTIONAL STATUS - GENERAL
CLIMB 3 TO 5 STEPS WITH RAILING: A LOT
STANDING UP FROM CHAIR USING ARMS: A LITTLE
MOBILITY SCORE: 18
MOVING TO AND FROM BED TO CHAIR: A LITTLE
MOVING TO AND FROM BED TO CHAIR: A LITTLE
PATIENT BASELINE BEDBOUND: NO
CLIMB 3 TO 5 STEPS WITH RAILING: A LITTLE
DAILY ACTIVITIY SCORE: 22
TURNING FROM BACK TO SIDE WHILE IN FLAT BAD: A LITTLE
WALKING IN HOSPITAL ROOM: A LITTLE
MOVING FROM LYING ON BACK TO SITTING ON SIDE OF FLAT BED WITH BEDRAILS: A LITTLE
MOBILITY SCORE: 17
DRESSING REGULAR LOWER BODY CLOTHING: A LITTLE
TURNING FROM BACK TO SIDE WHILE IN FLAT BAD: A LITTLE
WALKING IN HOSPITAL ROOM: A LITTLE
TOILETING: A LITTLE
MOVING FROM LYING ON BACK TO SITTING ON SIDE OF FLAT BED WITH BEDRAILS: A LITTLE
STANDING UP FROM CHAIR USING ARMS: A LITTLE

## 2025-02-10 ASSESSMENT — PAIN SCALES - GENERAL
PAINLEVEL_OUTOF10: 4
PAINLEVEL_OUTOF10: 0 - NO PAIN
PAINLEVEL_OUTOF10: 6
PAINLEVEL_OUTOF10: 7
PAINLEVEL_OUTOF10: 0 - NO PAIN
PAINLEVEL_OUTOF10: 0 - NO PAIN
PAINLEVEL_OUTOF10: 7
PAINLEVEL_OUTOF10: 7
PAINLEVEL_OUTOF10: 5 - MODERATE PAIN
PAINLEVEL_OUTOF10: 0 - NO PAIN
PAINLEVEL_OUTOF10: 0 - NO PAIN
PAIN_LEVEL: 3

## 2025-02-10 ASSESSMENT — ACTIVITIES OF DAILY LIVING (ADL)
LACK_OF_TRANSPORTATION: NO
BATHING: INDEPENDENT
GROOMING: INDEPENDENT
TOILETING: NEEDS ASSISTANCE
WALKS IN HOME: INDEPENDENT
HEARING - RIGHT EAR: FUNCTIONAL
FEEDING YOURSELF: INDEPENDENT
ADL_ASSISTANCE: INDEPENDENT
ADEQUATE_TO_COMPLETE_ADL: YES
JUDGMENT_ADEQUATE_SAFELY_COMPLETE_DAILY_ACTIVITIES: YES
HEARING - LEFT EAR: FUNCTIONAL
PATIENT'S MEMORY ADEQUATE TO SAFELY COMPLETE DAILY ACTIVITIES?: YES

## 2025-02-10 ASSESSMENT — LIFESTYLE VARIABLES
HOW OFTEN DO YOU HAVE 6 OR MORE DRINKS ON ONE OCCASION: NEVER
SKIP TO QUESTIONS 9-10: 1
AUDIT-C TOTAL SCORE: 0
HOW OFTEN DO YOU HAVE A DRINK CONTAINING ALCOHOL: NEVER
AUDIT-C TOTAL SCORE: 0
HOW MANY STANDARD DRINKS CONTAINING ALCOHOL DO YOU HAVE ON A TYPICAL DAY: PATIENT DOES NOT DRINK

## 2025-02-10 ASSESSMENT — PATIENT HEALTH QUESTIONNAIRE - PHQ9
2. FEELING DOWN, DEPRESSED OR HOPELESS: NOT AT ALL
SUM OF ALL RESPONSES TO PHQ9 QUESTIONS 1 & 2: 0
1. LITTLE INTEREST OR PLEASURE IN DOING THINGS: NOT AT ALL

## 2025-02-10 NOTE — ANESTHESIA PROCEDURE NOTES
Peripheral IV  Date/Time: 2/10/2025 8:04 AM  Inserted by: DAO Aponte Capp    Placement  Needle size: 18 G  Laterality: right  Location: hand  Local anesthetic: none  Site prep: chlorhexidine  Technique: anatomical landmarks  Attempts: 2

## 2025-02-10 NOTE — PERIOPERATIVE NURSING NOTE
0700: Dr leal anesthesiologist at bedside requesting ring be removed, unable to be removed so ring cut off and into 3 pieces handed to patients mother at the bedside.

## 2025-02-10 NOTE — ANESTHESIA PREPROCEDURE EVALUATION
Patient: Kallie Dale    Procedure Information       Date/Time: 02/10/25 0705    Procedure: LEFT sided L4-5 Castillo psoas lumbar interbody fusion for indirect decompression using BMP with cage placement and L4-5 Instrumentation using tibditS ROBOTIC NAVIGATION Assistance (Left: Spine Lumbar)    Location: [facility] OR 25 / [facility] OR    Surgeons: Juwan Hickey MD          Patient is a 59-year-old female with L4-5 spondylolisthesis with moderate to severe central canal and moderate foraminal stenosis.  Patient is being evaluated in CPM in anticipation of left sided L4-5 Castillo psoas lumbar interbody fusion for indirect decompression using BMP with cage placement and L4-5 Instrumentation using GLOBUS Excelsius GPS Robotic Navigation Assistance with Dr. Hickey on 2-10-25.     Relevant Problems   Anesthesia (within normal limits)      Cardiac   (+) Hyperlipidemia   (+) Hypertension      Pulmonary (within normal limits)  Prior smoker 1 ppd, quit 2/2020   (-) Chronic obstructive pulmonary disease (Multi) (Pt denies)   (-) KOFI (obstructive sleep apnea)      Neuro   (+) Anxiety   (+) Anxiety and depression   (+) Depression with anxiety   (+) Lumbar radiculopathy (Hx restless legs syndrome)   (+) Lumbar radiculopathy, chronic      GI   (+) GERD (gastroesophageal reflux disease)   (+) Ulcer, esophagus (Pt denies esophageal ulcer, only mild stomach ulcer with no bleeding)      /Renal (within normal limits)  Hx urge urinary incontinence      Liver (within normal limits)      Endocrine   (+) Class 3 severe obesity due to excess calories without serious comorbidity with body mass index (BMI) of 40.0 to 44.9 in adult   (-) Diabetes mellitus, type 2 (Multi) (Pre-diabetic)      Hematology   (-) DVT (deep venous thrombosis) (Multi) (Pt denies any hx of DVT --was tested for it once, was negative)      Musculoskeletal  Hx shoulder pain   (+) Degeneration of lumbar or lumbosacral intervertebral disc (Pt  presents for LEFT sided L4-5 Castillo psoas lumbar interbody fusion for indirect decompression using BMP with cage placement and L4-5 Instrumentation using [employer] Excelsius[employer]PS ROBOTIC NAVIGATION Assistance)   (+) Lumbar canal stenosis   (+) Lumbar foraminal stenosis   (+) Lumbar spondylosis   (+) Lumbar stenosis with neurogenic claudication      HEENT (within normal limits)      ID   (+) Candidiasis   (+) Ear infection   (+) Viral infection (Hx Herpes infection)      Skin   (+) Rash and other nonspecific skin eruption      GYN (within normal limits)  Hx  x2       Clinical information reviewed:   Tobacco  Allergies  Meds   Med Hx  Surg Hx  OB Status  Fam Hx  Soc   Hx        NPO Detail:  No data recorded     Physical Exam    Airway  Mallampati: III  TM distance: >3 FB  Neck ROM: full     Cardiovascular - normal exam  Rhythm: regular  Rate: normal     Dental   (+) implants     Pulmonary - normal exam     Abdominal - normal exam  Abdomen: soft  Bowel sounds: normal     Other findings: Many implants scattered throughout, chipped implant in right upper jaw, all dentition solid/none loose per patient          Anesthesia Plan    History of general anesthesia?: yes  History of complications of general anesthesia?: no    ASA 3     general   (Plan GETA with succinylcholine/PIVx2/A-line/possible post-op vent if necessary)  The patient is not a current smoker.  Patient was previously instructed to abstain from smoking on day of procedure.  Patient did not smoke on day of procedure.  Education provided regarding risk of obstructive sleep apnea.  intravenous induction   Postoperative administration of opioids is intended.  Trial extubation is planned.  Anesthetic plan and risks discussed with patient.  Use of blood products discussed with patient who consented to blood products.    Plan discussed with CAA and attending.

## 2025-02-10 NOTE — OP NOTE
LEFT sided L4-5 Castillo psoas lumbar interbody fusion for indirect decompression using BMP with cage placement and L4-5 Instrumentation using [employer] ExcelsiusGPS ROBOTIC NAVIGATION Assistance (L) Operative Note     Date: 2/10/2025  OR Location: [facility] OR    Name: Kallie Dale, : 1965, Age: 59 y.o., MRN: [id number], Sex: female    Diagnosis  Pre-op Diagnosis      * Spondylolisthesis at L4-L5 level [M43.16]     * Lumbar foraminal stenosis [M48.061]     * Lumbar radiculopathy, chronic [M54.16]     * Lumbar stenosis with neurogenic claudication [M48.062] Post-op Diagnosis     * Spondylolisthesis at L4-L5 level [M43.16]     * Lumbar foraminal stenosis [M48.061]     * Lumbar radiculopathy, chronic [M54.16]     * Lumbar stenosis with neurogenic claudication [M48.062]       Surgeons      * Juwan Hickey - Primary    Resident/Fellow/Other Assistant:  Surgeons and Role:     * Gregg Snow MD - Resident - Assisting     * Nehemias Merchant MD - Resident - Assisting    Staff:   Circulator: William Sheffieldub Person: Elise Sheffieldub Person: Joshua    Anesthesia Staff: Anesthesiologist: Servando Rachel MD  C-AA: DAO Aponte Capp  KRISTEN: Cindy Wetzel  Frontline Breaker: TERRENCE Hunt-CRNA, DNP      OPERATION/PROCEDURE:    1.  Anterior lumbar interbody arthrodesis via left lateral  retroperitoneal transpsoas approach, L4-5  2.  Placement of intervertebral biomechanical devices at L4-5   3.  Use of Osteopromotive material for spine surgery   4.  Posterior nonsegmental instrumentation L4-5  5.  Use of intraoperative computer-assisted neuronavigation with robotic assistance             TECHNIQUE:    After induction of general anesthesia, the patient was carefully turned into lateral decubitus position with the left side up. All dependent portions carefully padded. The patient was secured to the operating table and was carefully flexed at the table break exposing the right flank. This area was scrubbed, prepped, and  draped in the sterile fashion.  The L4-5 level was identified on AP and lateral fluoroscopy and one oblique incision was marked out over the left flank over the L4-5 level.  This was injected with local anesthesia and incised sharply. Subcutaneous tissue divided using monopolar cautery through the subcutaneous tissues to the fascia of the abdominal wall, which was opened sharply. Blunt dissection was then carried out to spread through the 3 layers of the abdominal wall musculature and the retroperitoneum was entered. Finger dissection allowed displacement of the retroperitoneum anteriorly and the psoas muscle was palpated and the stimulated dilator was then passed in a transpsoas manner to dock on the lateral aspect of the L4-5 disk space allowing avoidance of the femoral nerve. Stimulated dilation was performed and then the retractor docked over the disk space, the retractor was gently expanded and fixed to the L4-5 intervertebral disc space using a estevan. All of this was also performed using AP and lateral fluoroscopy. An annulotomy was carried out and a complete diskectomy performed. Intervertebral device trials were used to slowly distract the interspace and finally a lordotic titanium intervertebral biomechanical device with 10 degree lordosis, Nuvasive  was selected and packed with  rhBMP ( Medtronic ) which is an osteopromotive material for spine surgery. The device was inserted into the intervertebral space after first preparing the endplates for interbody arthrodesis. After satisfactory implant insertion, the estevan was removed and hemostasis achieved followed by retractor removal. At this point, final AP and lateral fluoroscopic images were taken to confirm satisfactory implant placement. The wounds were irrigated and closed in layers using interrupted 0 Vicryl for the fascia, interrupted inverted 2-0 Vicryl for the subcutaneous layer, and Dermabond for skin. The drapes were removed. Sterile dressing applied.   At this point the patient was then turned prone on a Juna table and all dependent portions carefully padded. Lumbar region was scrubbed, prepped, and draped in the usual sterile fashion. A stab incision was made over the right posterior-superior iliac spine and the percutaneous iliac pin inserted into the bone.  Another small incision was made on the left side followed by insertion of a second percutaneous pain with fiducial tracker on the top for additional accuracy  The dynamic reference array was attached to the percutaneous pin on the right side to which another frame was attached for registration during the intraoperative CT.  The O-arm was brought into the field and intraoperative CT scan was performed and the images transferred to the [employer] system for use in intraoperative image-guided computer-assisted navigation. The  Excelsius GPS robotic system was then utilized to plan paramedian incision over L4-5 level and screw trajectories.  Subsequently screws were inserted bilaterally at L4-5  level under navigation assistance using the robotic arm.  The [employer] CREO system was utilized. Appropriately sized rods were then fed through the extenders off the screw heads and seated into place using set caps. Final tightening was performed using the torque  and counter-torque after first engaging the reduction mechanism to further reduce the spondylolisthesis. The extenders off the screw heads were removed. Final AP and lateral fluoroscopic images were taken to confirm satisfactory implant placement. The wound was closed in layers using interrupted 0 Vicryl for the fascia, interrupted inverted 2-0 Vicryl for the subcutaneous layer, and Dermabond for the skin. The drapes were removed. Sterile dressings were applied. The patient was turned back supine, awoken from anesthesia, and taken to the recovery room.   The EBL was about 150 ml       Complications:  None; patient tolerated the procedure  well.    Disposition: PACU - hemodynamically stable.  Condition: stable      Juwan Hickey  Phone Number: [phone]'

## 2025-02-10 NOTE — ANESTHESIA PROCEDURE NOTES
Arterial Line:    Date/Time: 2/10/2025 8:08 AM    Staffing  Performed: CAA   Authorized by: Servando Rachel MD    Performed by: DAO Aponte Capp    An arterial line was placed. Procedure performed using surface landmarks.in the OR for the following indication(s): continuous blood pressure monitoring and blood sampling needed.    A 20 gauge (size), 1 and 3/4 inch (length), Angiocath (type) catheter was placed into the Left radial artery, secured by Tegaderm,   Seldinger technique not used.  Events:  patient tolerated procedure well with no complications.      Additional notes:  Sterile prep and glove. Sterile dressing placed.

## 2025-02-10 NOTE — ANESTHESIA PROCEDURE NOTES
Airway  Date/Time: 2/10/2025 7:47 AM  Urgency: elective    Airway not difficult    Staffing  Performed: attending   Authorized by: Servando Rachel MD    Performed by: Johnathon Lee MD  Patient location during procedure: OR    Indications and Patient Condition  Indications for airway management: airway protection and anesthesia  Spontaneous Ventilation: absent  Sedation level: deep  Preoxygenated: yes  Patient position: sniffing  MILS not maintained throughout  Mask difficulty assessment: 1 - vent by mask  Planned trial extubation    Final Airway Details  Final airway type: endotracheal airway      Successful airway: ETT  Cuffed: yes   Successful intubation technique: direct laryngoscopy  Facilitating devices/methods: intubating stylet and cricoid pressure  Endotracheal tube insertion site: oral  Blade: Abril  Blade size: #3  ETT size (mm): 7.5  Cormack-Lehane Classification: grade IIb - view of arytenoids or posterior of glottis only  Placement verified by: chest auscultation and capnometry   Measured from: gums  ETT to gums (cm): 22  Number of attempts at approach: 1  Ventilation between attempts: none  Number of other approaches attempted: 0    Additional Comments  Atraumatic Intubation. Lips and teeth in preanesthetic condition.

## 2025-02-10 NOTE — HOSPITAL COURSE
Kallie Dale is a 59 year old female with a past medical history of HTN, HLD and GERD who presented with neurogenic claudication and taken to the OR on 2/10 for L4/5 XLIF with percutaneous instrumentation. She was admitted to the neurosurgery service post operatively.     PT/OT evaluated patient and recommended low level of continued therapy for which referral placed for home health care.     On the day of discharge, the patient was seen and evaluated by the neurosurgery team and deemed suitable for discharge. The patient was given detailed discharge instructions and were scheduled to follow up as an outpatient.

## 2025-02-10 NOTE — PROGRESS NOTES
Physical Therapy    Physical Therapy Evaluation & Treatment    Patient Name: Kallie Dale  MRN: [id number]  Department: [place] [address]  Room: [facility]-  Today's Date: 2/10/2025   Time Calculation  Start Time: 1459  Stop Time: 1522  Time Calculation (min): 23 min    Assessment/Plan   PT Assessment  PT Assessment Results: Decreased endurance, Impaired balance, Decreased mobility  Rehab Prognosis: Good  Barriers to Discharge Home: No anticipated barriers  Evaluation/Treatment Tolerance: Patient tolerated treatment well  Medical Staff Made Aware: Yes  End of Session Communication: Bedside nurse  Assessment Comment: Patient is a 58yo F s/p  L4-5 Castillo psoas lumbar interbody fusion for indirect decompression using BMP with cage placement and L4-5 Instrumentation using GLOBUS Excelsius GPS Robotic Navigation Assistance with Dr. Hickey on 2-10-25. Patient is indep at baseline and lives alone. reports son will be staying with her at dc. Patient is CGA-min A for mobility at this time. dc plan low  End of Session Patient Position: Up in chair, Alarm on   IP OR SWING BED PT PLAN  Inpatient or Swing Bed: Inpatient  PT Plan  Treatment/Interventions: Bed mobility, Transfer training, Gait training, Stair training, Balance training, Neuromuscular re-education, Strengthening, Therapeutic exercise, Endurance training, Range of motion, Therapeutic activity  PT Plan: Ongoing PT  PT Frequency: Daily  PT Discharge Recommendations: Low intensity level of continued care  PT Recommended Transfer Status: Assistive device, Contact guard  PT - OK to Discharge: Yes      Subjective     General Visit Information:  General  Reason for Referral: s/p  L4-5 Castillo psoas lumbar interbody fusion for indirect decompression using BMP with cage placement and L4-5 Instrumentation using GLOBUS Excelsius GPS Robotic Navigation Assistance with Dr. Hickey on 2-10-25.  Past Medical History Relevant to Rehab: L4-5 spondylolisthesis with moderate to severe central  canal and moderate foraminal stenosis.  Prior to Session Communication: Bedside nurse  Patient Position Received: Bed, 3 rail up, Alarm on  General Comment: Patient supine in bed, RN cleared. pleasant and cooperative.  Home Living:  Home Living  Type of Home: House  Lives With: Alone (reports son will be flying in to care for her in her recovery)  Home Adaptive Equipment: Walker rolling or standard  Home Layout:  (reports sleeping on couch or recliner on first floor due to preference. full bath upstairs)  Home Access: Stairs to enter with rails  Entrance Stairs-Number of Steps: 2  Bathroom Shower/Tub: Walk-in shower  Bathroom Equipment: Shower chair with back  Prior Level of Function:  Prior Function Per Pt/Caregiver Report  Level of [place]: Independent with homemaking with ambulation, Independent with ADLs and functional transfers  ADL Assistance: Independent  Homemaking Assistance: Independent  Ambulatory Assistance: Independent  Vocational: Self employed  Leisure: travel  Prior Function Comments: + drive, - falls  Precautions:  Precautions  Medical Precautions: Fall precautions  Post-Surgical Precautions: Spinal precautions     Date/Time Vitals Session Patient Position Pulse Resp SpO2 BP MAP (mmHg)    02/10/25 1345 --  --  67  16  92 %  131/82  101     02/10/25 1400 --  --  69  14  94 %  125/74  94                Objective   Pain:  Pain Assessment  Pain Assessment: 0-10  0-10 (Numeric) Pain Score: 6  Pain Type: Surgical pain  Pain Location: Back  Pain Interventions: Repositioned  Cognition:  Cognition  Overall Cognitive Status: Within Functional Limits    General Assessments:        Activity Tolerance  Endurance: Tolerates 10 - 20 min exercise with multiple rests    Sensation  Light Touch:  (denies- reports improved since prior to surgery)       Static Sitting Balance  Static Sitting-Level of Assistance: Close supervision  Static Sitting-Comment/Number of Minutes: sat EOB for 5 min x2  Functional  Assessments:  Bed Mobility  Bed Mobility: Yes  Bed Mobility 1  Bed Mobility 1: Supine to sitting, Log roll  Level of Assistance 1: Contact guard, Minimal verbal cues  Bed Mobility Comments 1: cues for sequencing log roll    Transfers  Transfer: Yes  Transfer 1  Transfer From 1: Sit to, Stand to  Transfer to 1: Stand, Sit  Technique 1: Sit to stand, Stand to sit  Transfer Device 1: Walker  Transfer Level of Assistance 1: Minimum assistance  Trials/Comments 1: stood twice from EOB    Ambulation/Gait Training  Ambulation/Gait Training Performed: Yes  Ambulation/Gait Training 1  Surface 1: Level tile  Device 1: Rolling walker  Assistance 1: Contact guard  Quality of Gait 1: Decreased step length, Shuffling gait (decreased kevin)  Comments/Distance (ft) 1: lateral steps along EOB 2'. seated rest then 3' to chair  Extremity/Trunk Assessments:  RLE   RLE : Within Functional Limits  LLE   LLE : Within Functional Limits  Treatments:  Therapeutic Activity  Therapeutic Activity Performed: Yes  Therapeutic Activity 1: edu on spinal precautions and log roll. sup > sit sat EOB for 5 min, stood and took lateral steps.  Therapeutic Activity 2: seated rest break, stood again from EOB and transferred to chair  Outcome Measures:  [facility] Basic Mobility  Turning from your back to your side while in a flat bed without using bedrails: A little  Moving from lying on your back to sitting on the side of a flat bed without using bedrails: A little  Moving to and from bed to chair (including a wheelchair): A little  Standing up from a chair using your arms (e.g. wheelchair or bedside chair): A little  To walk in hospital room: A little  Climbing 3-5 steps with railing: A lot  Basic Mobility - Total Score: 17    Encounter Problems       Encounter Problems (Active)       Mobility       STG - Patient will ambulate > 250' with LRAD modif indep (Progressing)       Start:  02/10/25    Expected End:  02/17/25            STG - Patient will ascend and  descend a flight of stairs LRAD modif indep (Progressing)       Start:  02/10/25    Expected End:  02/17/25               PT Transfers       STG - Patient will perform bed mobility modif indep with log roll  (Progressing)       Start:  02/10/25    Expected End:  02/17/25            STG - Patient will transfer sit to and from stand modif indep LRAD  (Progressing)       Start:  02/10/25    Expected End:  02/17/25               Pain - Adult              Education Documentation  Precautions, taught by Michelle Bowman PT at 2/10/2025  3:32 PM.  Learner: Patient  Readiness: Acceptance  Method: Explanation  Response: Verbalizes Understanding  Comment: edu on role of PT, spinal precautions and dc plan    Mobility Training, taught by Michelle Bowman PT at 2/10/2025  3:32 PM.  Learner: Patient  Readiness: Acceptance  Method: Explanation  Response: Verbalizes Understanding  Comment: edu on role of PT, spinal precautions and dc plan    Education Comments  No comments found.

## 2025-02-10 NOTE — ANESTHESIA POSTPROCEDURE EVALUATION
Patient: Kallie Dale    Procedure Summary       Date: 02/10/25 Room / Location: [facility] OR 25 / [facility] [place]    Anesthesia Start: 0726 Anesthesia Stop: 1258    Procedure: LEFT sided L4-5 Castillo psoas lumbar interbody fusion for indirect decompression using BMP with cage placement and L4-5 Instrumentation using [employer] ExcelsiusGPS ROBOTIC NAVIGATION Assistance (Left: Spine Lumbar) Diagnosis:       Spondylolisthesis at L4-L5 level      Lumbar foraminal stenosis      Lumbar radiculopathy, chronic      Lumbar stenosis with neurogenic claudication      (Spondylolisthesis at L4-L5 level [M43.16])      (Lumbar foraminal stenosis [M48.061])      (Lumbar radiculopathy, chronic [M54.16])      (Lumbar stenosis with neurogenic claudication [M48.062])    Surgeons: Juwan Hickey MD Responsible Provider: Servando Rachel MD    Anesthesia Type: general ASA Status: 3            Anesthesia Type: general    Vitals Value Taken Time   /78 02/10/25 1258   Temp 36.1 02/10/25 1258   Pulse 85 02/10/25 1258   Resp 16 02/10/25 1258   SpO2 100 02/10/25 1258       Anesthesia Post Evaluation    Patient location during evaluation: PACU  Patient participation: complete - patient participated  Level of consciousness: awake and alert  Pain score: 3  Pain management: adequate  Multimodal analgesia pain management approach  Airway patency: patent  Two or more strategies used to mitigate risk of obstructive sleep apnea  Cardiovascular status: acceptable and hemodynamically stable  Respiratory status: acceptable, face mask and spontaneous ventilation  Hydration status: acceptable  Postoperative Nausea and Vomiting: none        There were no known notable events for this encounter.

## 2025-02-11 ENCOUNTER — HOME HEALTH ADMISSION (OUTPATIENT)
Dept: HOME HEALTH SERVICES | Facility: HOME HEALTH | Age: 60
End: 2025-02-11
Payer: COMMERCIAL

## 2025-02-11 ENCOUNTER — PHARMACY VISIT (OUTPATIENT)
Dept: PHARMACY | Facility: CLINIC | Age: 60
End: 2025-02-11
Payer: MEDICARE

## 2025-02-11 ENCOUNTER — DOCUMENTATION (OUTPATIENT)
Dept: HOME HEALTH SERVICES | Facility: HOME HEALTH | Age: 60
End: 2025-02-11

## 2025-02-11 VITALS
DIASTOLIC BLOOD PRESSURE: 72 MMHG | HEART RATE: 81 BPM | SYSTOLIC BLOOD PRESSURE: 110 MMHG | BODY MASS INDEX: 43.13 KG/M2 | HEIGHT: 64 IN | WEIGHT: 252.65 LBS | TEMPERATURE: 96.8 F | RESPIRATION RATE: 16 BRPM | OXYGEN SATURATION: 91 %

## 2025-02-11 LAB
ANION GAP SERPL CALC-SCNC: 14 MMOL/L (ref 10–20)
BUN SERPL-MCNC: 11 MG/DL (ref 6–23)
CALCIUM SERPL-MCNC: 8.7 MG/DL (ref 8.6–10.6)
CHLORIDE SERPL-SCNC: 106 MMOL/L (ref 98–107)
CO2 SERPL-SCNC: 22 MMOL/L (ref 21–32)
CREAT SERPL-MCNC: 0.71 MG/DL (ref 0.5–1.05)
EGFRCR SERPLBLD CKD-EPI 2021: >90 ML/MIN/1.73M*2
ERYTHROCYTE [DISTWIDTH] IN BLOOD BY AUTOMATED COUNT: 12.9 % (ref 11.5–14.5)
GLUCOSE BLD MANUAL STRIP-MCNC: 106 MG/DL (ref 74–99)
GLUCOSE BLD MANUAL STRIP-MCNC: 110 MG/DL (ref 74–99)
GLUCOSE SERPL-MCNC: 93 MG/DL (ref 74–99)
HCT VFR BLD AUTO: 34.6 % (ref 36–46)
HGB BLD-MCNC: 10.6 G/DL (ref 12–16)
MCH RBC QN AUTO: 30.5 PG (ref 26–34)
MCHC RBC AUTO-ENTMCNC: 30.6 G/DL (ref 32–36)
MCV RBC AUTO: 99 FL (ref 80–100)
NRBC BLD-RTO: 0 /100 WBCS (ref 0–0)
PLATELET # BLD AUTO: 199 X10*3/UL (ref 150–450)
POTASSIUM SERPL-SCNC: 3.8 MMOL/L (ref 3.5–5.3)
RBC # BLD AUTO: 3.48 X10*6/UL (ref 4–5.2)
SODIUM SERPL-SCNC: 138 MMOL/L (ref 136–145)
WBC # BLD AUTO: 7 X10*3/UL (ref 4.4–11.3)

## 2025-02-11 PROCEDURE — 82947 ASSAY GLUCOSE BLOOD QUANT: CPT

## 2025-02-11 PROCEDURE — 97535 SELF CARE MNGMENT TRAINING: CPT | Mod: GO

## 2025-02-11 PROCEDURE — 97165 OT EVAL LOW COMPLEX 30 MIN: CPT | Mod: GO

## 2025-02-11 PROCEDURE — 36415 COLL VENOUS BLD VENIPUNCTURE: CPT | Performed by: STUDENT IN AN ORGANIZED HEALTH CARE EDUCATION/TRAINING PROGRAM

## 2025-02-11 PROCEDURE — 96372 THER/PROPH/DIAG INJ SC/IM: CPT | Performed by: STUDENT IN AN ORGANIZED HEALTH CARE EDUCATION/TRAINING PROGRAM

## 2025-02-11 PROCEDURE — 80048 BASIC METABOLIC PNL TOTAL CA: CPT | Performed by: STUDENT IN AN ORGANIZED HEALTH CARE EDUCATION/TRAINING PROGRAM

## 2025-02-11 PROCEDURE — 97116 GAIT TRAINING THERAPY: CPT | Mod: GP,CQ

## 2025-02-11 PROCEDURE — 97530 THERAPEUTIC ACTIVITIES: CPT | Mod: GP,CQ

## 2025-02-11 PROCEDURE — RXMED WILLOW AMBULATORY MEDICATION CHARGE

## 2025-02-11 PROCEDURE — 85027 COMPLETE CBC AUTOMATED: CPT | Performed by: STUDENT IN AN ORGANIZED HEALTH CARE EDUCATION/TRAINING PROGRAM

## 2025-02-11 PROCEDURE — 2500000004 HC RX 250 GENERAL PHARMACY W/ HCPCS (ALT 636 FOR OP/ED): Performed by: STUDENT IN AN ORGANIZED HEALTH CARE EDUCATION/TRAINING PROGRAM

## 2025-02-11 PROCEDURE — 1100000001 HC PRIVATE ROOM DAILY

## 2025-02-11 PROCEDURE — 2500000001 HC RX 250 WO HCPCS SELF ADMINISTERED DRUGS (ALT 637 FOR MEDICARE OP): Performed by: STUDENT IN AN ORGANIZED HEALTH CARE EDUCATION/TRAINING PROGRAM

## 2025-02-11 PROCEDURE — 99239 HOSP IP/OBS DSCHRG MGMT >30: CPT | Performed by: PHYSICIAN ASSISTANT

## 2025-02-11 RX ORDER — OXYCODONE HYDROCHLORIDE 5 MG/1
5 TABLET ORAL EVERY 6 HOURS PRN
Qty: 28 TABLET | Refills: 0 | Status: SHIPPED | OUTPATIENT
Start: 2025-02-11 | End: 2025-02-18

## 2025-02-11 RX ORDER — IBUPROFEN 200 MG
800 TABLET ORAL EVERY 8 HOURS PRN
Start: 2025-02-11

## 2025-02-11 RX ORDER — ACETAMINOPHEN 325 MG/1
650 TABLET ORAL EVERY 6 HOURS
Qty: 56 TABLET | Refills: 0 | Status: SHIPPED | OUTPATIENT
Start: 2025-02-11 | End: 2025-02-18

## 2025-02-11 RX ORDER — POLYETHYLENE GLYCOL 3350 17 G/17G
17 POWDER, FOR SOLUTION ORAL 2 TIMES DAILY
Qty: 14 PACKET | Refills: 0 | Status: SHIPPED | OUTPATIENT
Start: 2025-02-11 | End: 2025-02-18

## 2025-02-11 RX ORDER — CYCLOBENZAPRINE HCL 10 MG
10 TABLET ORAL 3 TIMES DAILY
Qty: 21 TABLET | Refills: 0 | Status: SHIPPED | OUTPATIENT
Start: 2025-02-11 | End: 2025-02-18

## 2025-02-11 RX ORDER — AMOXICILLIN 250 MG
2 CAPSULE ORAL 2 TIMES DAILY
Qty: 28 TABLET | Refills: 0 | Status: SHIPPED | OUTPATIENT
Start: 2025-02-11 | End: 2025-02-18

## 2025-02-11 RX ORDER — IBUPROFEN 200 MG
800 TABLET ORAL EVERY 8 HOURS PRN
Status: ON HOLD | COMMUNITY
End: 2025-02-11

## 2025-02-11 RX ORDER — ACETAMINOPHEN 500 MG
1000 TABLET ORAL EVERY 6 HOURS PRN
COMMUNITY

## 2025-02-11 RX ADMIN — ACETAMINOPHEN 650 MG: 325 TABLET ORAL at 09:01

## 2025-02-11 RX ADMIN — OXYCODONE 10 MG: 5 TABLET ORAL at 00:45

## 2025-02-11 RX ADMIN — ACETAMINOPHEN 650 MG: 325 TABLET ORAL at 13:55

## 2025-02-11 RX ADMIN — SENNOSIDES AND DOCUSATE SODIUM 2 TABLET: 50; 8.6 TABLET ORAL at 09:02

## 2025-02-11 RX ADMIN — HEPARIN SODIUM 7500 UNITS: 5000 INJECTION, SOLUTION INTRAVENOUS; SUBCUTANEOUS at 13:54

## 2025-02-11 RX ADMIN — OXYCODONE 10 MG: 5 TABLET ORAL at 13:54

## 2025-02-11 RX ADMIN — METOPROLOL SUCCINATE 50 MG: 50 TABLET, EXTENDED RELEASE ORAL at 09:01

## 2025-02-11 RX ADMIN — CYCLOBENZAPRINE 10 MG: 10 TABLET, FILM COATED ORAL at 09:01

## 2025-02-11 RX ADMIN — OXYCODONE 10 MG: 5 TABLET ORAL at 05:23

## 2025-02-11 RX ADMIN — HEPARIN SODIUM 7500 UNITS: 5000 INJECTION, SOLUTION INTRAVENOUS; SUBCUTANEOUS at 05:24

## 2025-02-11 RX ADMIN — ESCITALOPRAM OXALATE 20 MG: 20 TABLET, FILM COATED ORAL at 09:01

## 2025-02-11 ASSESSMENT — PAIN SCALES - GENERAL
PAINLEVEL_OUTOF10: 6
PAINLEVEL_OUTOF10: 6
PAINLEVEL_OUTOF10: 5 - MODERATE PAIN
PAINLEVEL_OUTOF10: 7
PAINLEVEL_OUTOF10: 7
PAINLEVEL_OUTOF10: 0 - NO PAIN
PAINLEVEL_OUTOF10: 7
PAINLEVEL_OUTOF10: 0 - NO PAIN

## 2025-02-11 ASSESSMENT — COGNITIVE AND FUNCTIONAL STATUS - GENERAL
STANDING UP FROM CHAIR USING ARMS: A LITTLE
DAILY ACTIVITIY SCORE: 19
MOBILITY SCORE: 22
HELP NEEDED FOR BATHING: A LITTLE
HELP NEEDED FOR BATHING: A LITTLE
WALKING IN HOSPITAL ROOM: A LITTLE
PERSONAL GROOMING: A LITTLE
MOVING TO AND FROM BED TO CHAIR: A LITTLE
WALKING IN HOSPITAL ROOM: A LITTLE
DRESSING REGULAR LOWER BODY CLOTHING: A LITTLE
MOVING FROM LYING ON BACK TO SITTING ON SIDE OF FLAT BED WITH BEDRAILS: A LITTLE
DRESSING REGULAR UPPER BODY CLOTHING: A LITTLE
CLIMB 3 TO 5 STEPS WITH RAILING: A LITTLE
MOBILITY SCORE: 18
CLIMB 3 TO 5 STEPS WITH RAILING: A LITTLE
DRESSING REGULAR LOWER BODY CLOTHING: A LITTLE
TOILETING: A LITTLE
TURNING FROM BACK TO SIDE WHILE IN FLAT BAD: A LITTLE
DAILY ACTIVITIY SCORE: 22

## 2025-02-11 ASSESSMENT — ACTIVITIES OF DAILY LIVING (ADL)
LACK_OF_TRANSPORTATION: NO
BATHING_ASSISTANCE: MINIMAL
HOME_MANAGEMENT_TIME_ENTRY: 10

## 2025-02-11 ASSESSMENT — PAIN - FUNCTIONAL ASSESSMENT
PAIN_FUNCTIONAL_ASSESSMENT: 0-10

## 2025-02-11 ASSESSMENT — PAIN DESCRIPTION - LOCATION: LOCATION: BACK

## 2025-02-11 NOTE — PROGRESS NOTES
Physical Therapy    Physical Therapy Treatment    Patient Name: Kallie Dale  MRN: [id number]  Department: [place] [address]  Room: [facility]  Today's Date: 2/11/2025  Time Calculation  Start Time: 1430  Stop Time: 1453  Time Calculation (min): 23 min         Assessment/Plan   PT Assessment  Barriers to Discharge Home: No anticipated barriers  End of Session Communication: Bedside nurse  Assessment Comment: Pt tolerated PT session well, able to tolerate increased ambulation and stair negotiation. Pt continues to remain appropriate for Low intensity PT upon D/C from hospital.  PT Plan  Inpatient/Swing Bed or Outpatient: Inpatient  PT Plan  Treatment/Interventions: Bed mobility, Transfer training, Gait training, Stair training, Balance training, Neuromuscular re-education, Strengthening, Therapeutic exercise, Endurance training, Range of motion, Therapeutic activity  PT Plan: Ongoing PT  PT Frequency: Daily  PT Discharge Recommendations: Low intensity level of continued care  PT Recommended Transfer Status: Assistive device, Stand by assist  PT - OK to Discharge: Yes      General Visit Information:   PT  Visit  PT Received On: 02/11/25  General  Missed Visit Reason:  (n/a)  Family/Caregiver Present: No  Prior to Session Communication: Bedside nurse  Patient Position Received: Bed, 3 rail up, Alarm on  General Comment: Pt supine in bed, agreeable to work with PT and trial steps, eager to ambulate.    Subjective   Precautions:  Precautions  Medical Precautions: Fall precautions  Post-Surgical Precautions: Spinal precautions     Date/Time Vitals Session Patient Position Pulse Resp SpO2 BP MAP (mmHg)    02/11/25 1430 During PT  --  81  --  91 %  --  --           Vital Signs Comment: Post: HR 84, SpO2 99%     Objective   Pain:  Pain Assessment  Pain Assessment: 0-10  0-10 (Numeric) Pain Score: 6  Pain Location:  (Groin, anterior thighs)  Cognition:  Cognition  Overall Cognitive Status: Within Functional Limits  Orientation  Level: Oriented X4    Activity Tolerance:  Activity Tolerance  Endurance: Tolerates 30 min exercise with multiple rests  Treatments:  Bed Mobility  Bed Mobility: Yes  Bed Mobility 1  Bed Mobility 1: Supine to sitting  Level of Assistance 1: Close supervision, Minimal verbal cues  Bed Mobility Comments 1: HOB slightly elevated, cues for log roll, increased time to perform    Ambulation/Gait Training  Ambulation/Gait Training Performed: Yes  Ambulation/Gait Training 1  Surface 1: Level tile  Device 1: Rolling walker  Assistance 1: Close supervision  Quality of Gait 1: Decreased step length (Slightly decreased kevin,)  Comments/Distance (ft) 1: 125ft x2 trials, stairs performed between, good upright posture, denied dizziness, SOB or fatigue.    Transfers  Transfer: Yes  Transfer 1  Transfer From 1: Bed to  Transfer to 1: Stand  Technique 1: Sit to stand, Stand to sit  Transfer Device 1: Walker  Transfer Level of Assistance 1: Contact guard, Minimal verbal cues  Trials/Comments 1: x1 trial, cues for sequencing and safe hand placement.  Transfers 2  Transfer From 2: Sit to  Transfer to 2: Stand  Technique 2: Sit to stand, Stand to sit  Transfer Device 2: Walker  Transfer Level of Assistance 2: Close supervision, Minimal verbal cues  Trials/Comments 2: x1 trial, cues for sequencing,    Stairs  Stairs: Yes  Stairs  Rails 1: Bilateral  Curb Step 1: No  Device 1: Railing  Assistance 1: Contact guard, Minimal verbal cues  Comment/Number of Steps 1: x4, BUE support, step to step ascending/descending  Stairs 2  Rails 2: Left  Curb Step 2: No  Device 2: Railing  Assistance 2: Contact guard, Minimal verbal cues  Comment/Number of Steps 2: x4, step over step ascending/descending, with handrail home setup.    Outcome Measures:  [facility] Basic Mobility  Turning from your back to your side while in a flat bed without using bedrails: A little  Moving from lying on your back to sitting on the side of a flat bed without using bedrails: A  little  Moving to and from bed to chair (including a wheelchair): A little  Standing up from a chair using your arms (e.g. wheelchair or bedside chair): A little  To walk in hospital room: A little  Climbing 3-5 steps with railing: A little  Basic Mobility - Total Score: 18    Education Documentation  Precautions, taught by Maia Victor PTA at 2/11/2025  3:06 PM.  Learner: Patient  Readiness: Acceptance  Method: Explanation  Response: Verbalizes Understanding, Demonstrated Understanding  Comment: Spine precautions, continued ambulation and increasing activity.    Mobility Training, taught by Maia Victor PTA at 2/11/2025  3:06 PM.  Learner: Patient  Readiness: Acceptance  Method: Explanation  Response: Verbalizes Understanding, Demonstrated Understanding  Comment: Spine precautions, continued ambulation and increasing activity.    Education Comments  No comments found.        OP EDUCATION:       Encounter Problems       Encounter Problems (Active)       Mobility       STG - Patient will ambulate > 250' with LRAD modif indep (Progressing)       Start:  02/10/25    Expected End:  02/17/25            STG - Patient will ascend and descend a flight of stairs LRAD modif indep (Progressing)       Start:  02/10/25    Expected End:  02/17/25               PT Transfers       STG - Patient will perform bed mobility modif indep with log roll  (Progressing)       Start:  02/10/25    Expected End:  02/17/25            STG - Patient will transfer sit to and from stand modif indep LRAD  (Progressing)       Start:  02/10/25    Expected End:  02/17/25               Pain - Adult            02/11/25 at 3:07 PM   Maia Victor PTA   Rehab Office: [phone]

## 2025-02-11 NOTE — PROGRESS NOTES
Occupational Therapy      Evaluation and Treatment    Patient Name: Kallie Dale  MRN: [id number]  Today's Date: 2/11/2025  Room: [address] [place] [address]  Time Calculation  Start Time: 0835  Stop Time: 0900  Time Calculation (min): 25 min    Assessment  IP OT Assessment  Prognosis: Excellent  Barriers to Discharge Home: No anticipated barriers  Evaluation/Treatment Tolerance: Patient tolerated treatment well  End of Session Communication: Bedside nurse, PCT/NA/CTA  End of Session Patient Position: Bed, 2 rail up, Alarm on  Plan:  Inpatient Plan  Treatment Interventions: ADL retraining, Functional transfer training, Compensatory technique education  OT Frequency: 2 times per week  OT Discharge Recommendations: No OT needed after discharge  Equipment Recommended upon Discharge:  (sock aid, long handled sponge)  OT Recommended Transfer Status: Assist of 1  OT - OK to Discharge: Yes  OT Assessment  OT Assessment Results: Decreased ADL status, Decreased IADLs, Decreased functional mobility  Prognosis: Excellent  Evaluation/Treatment Tolerance: Patient tolerated treatment well    Subjective   Current Problem:  1. Spondylolisthesis at L4-L5 level        2. Lumbar foraminal stenosis        3. Lumbar radiculopathy, chronic  acetaminophen (Tylenol) 325 mg tablet    cyclobenzaprine (Flexeril) 10 mg tablet    oxyCODONE (Roxicodone) 5 mg immediate release tablet    polyethylene glycol (Glycolax, Miralax) 17 gram packet    sennosides-docusate sodium (Abi-Colace) 8.6-50 mg tablet      4. Lumbar stenosis with neurogenic claudication        5. S/P spinal surgery  acetaminophen (Tylenol) 325 mg tablet    cyclobenzaprine (Flexeril) 10 mg tablet    oxyCODONE (Roxicodone) 5 mg immediate release tablet    polyethylene glycol (Glycolax, Miralax) 17 gram packet    sennosides-docusate sodium (Abi-Colace) 8.6-50 mg tablet    Referral to Home Health      6. Acute post-operative pain  acetaminophen (Tylenol) 325 mg tablet    cyclobenzaprine (Flexeril)  10 mg tablet    oxyCODONE (Roxicodone) 5 mg immediate release tablet    polyethylene glycol (Glycolax, Miralax) 17 gram packet    sennosides-docusate sodium (Abi-Colace) 8.6-50 mg tablet    Referral to Home Health      7. Impaired functional mobility, balance, and endurance  Referral to Home Health        General:  Reason for Referral: s/p  L4-5 Castillo psoas lumbar interbody fusion for indirect decompression using BMP with cage placement and L4-5 Instrumentation using GLOBUS Excelsius GPS Robotic Navigation Assistance with Dr. Hickey on 2-10-25.  Past Medical History Relevant to Rehab: L4-5 spondylolisthesis with moderate to severe central canal and moderate foraminal stenosis.  Prior to Session Communication: Bedside nurse  Patient Position Received: Up in chair, Alarm on  General Comment: Pt in chair on arrival, motivated to particiapte in OT   Precautions:  Medical Precautions: Fall precautions  Post-Surgical Precautions: Spinal precautions  Vital Signs:    Pain:  Pain Assessment  Pain Assessment: 0-10  0-10 (Numeric) Pain Score: 0 - No pain  Lines/Tubes/Drains:         Objective   Cognition:  Overall Cognitive Status: Within Functional Limits  Orientation Level: Oriented X4           Home Living:  Type of Home: House  Lives With: Alone (parents live close by, son is flying in to town to stay with pt for few days)  Home Adaptive Equipment: Walker rolling or standard  Home Layout: Two level, 1/2 bath on main level, Bed/bath upstairs  Bathroom Shower/Tub: Walk-in shower  Bathroom Equipment: Shower chair with back  Home Living Comments: pt reports she sleeps in either recliner or on couch on 1st floor   Prior Function:  Level of [place]: Independent with homemaking with ambulation, Independent with ADLs and functional transfers  Prior Function Comments: pt reports she works from home, drives, takes care of her small dog       ADL:  Eating Assistance: Independent  Eating Deficit: Setup  Grooming Assistance:  Stand by  Grooming Deficit: Setup  Bathing Assistance: Minimal  UE Dressing Assistance: Stand by  UE Dressing Deficit: Setup  LE Dressing Assistance: Moderate  LE Dressing Deficit: Don/doff R sock, Don/doff L sock, Thread RLE into pants, Thread LLE into pants  Toileting Assistance with Device: Stand by  Activity Tolerance:  Endurance: Tolerates 10 - 20 min exercise with multiple rests       Bed Mobility/Transfers: Bed Mobility/Transfers: Bed Mobility  Bed Mobility: Yes  Bed Mobility 1  Bed Mobility 1: Sitting to supine  Level of Assistance 1: Contact guard, Minimal verbal cues  Bed Mobility Comments 1: cues to follow log roll technique  Functional Mobility  Functional Mobility Performed: Yes  Functional Mobility 1  Surface 1: Level tile  Device 1: Rolling walker  Assistance 1: Contact guard   and Transfers  Transfer: Yes  Transfer 1  Technique 1: Sit to stand, Stand to sit  Transfer Device 1: Walker  Transfer Level of Assistance 1: Contact guard  Transfers 2  Transfer From 2: Chair with arms to  Transfer to 2: Bed  Technique 2: Sit to stand, Stand to sit  Transfer Device 2: Walker  Transfer Level of Assistance 2: Contact guard       Vision: Vision - Basic Assessment  Current Vision: No visual deficits   and    Sensation:  Light Touch: No apparent deficits       Extremities:   RUE   RUE : Within Functional Limits, LUE   LUE: Within Functional Limits, RLE   RLE : Within Functional Limits, and LLE   LLE : Within Functional Limits      Outcome Measures: [facility] Daily Activity  Putting on and taking off regular lower body clothing: A little  Bathing (including washing, rinsing, drying): A little  Putting on and taking off regular upper body clothing: A little  Toileting, which includes using toilet, bedpan or urinal: A little  Taking care of personal grooming such as brushing teeth: A little  Eating Meals: None  Daily Activity - Total Score: 19         ,     OT Adult Other Outcome Measures  4AT: negative    Education  Documentation  Handouts, taught by Lazara Ortiz OT at 2/11/2025 12:16 PM.  Learner: Patient  Readiness: Acceptance  Method: Explanation  Response: Verbalizes Understanding  Comment: Pt educated on spine precautions, log roll technique, LB dressing    Body Mechanics, taught by Lazara Ortiz OT at 2/11/2025 12:16 PM.  Learner: Patient  Readiness: Acceptance  Method: Explanation  Response: Verbalizes Understanding  Comment: Pt educated on spine precautions, log roll technique, LB dressing    Precautions, taught by Lazara Ortiz OT at 2/11/2025 12:16 PM.  Learner: Patient  Readiness: Acceptance  Method: Explanation  Response: Verbalizes Understanding  Comment: Pt educated on spine precautions, log roll technique, LB dressing    ADL Training, taught by Lazara Ortiz OT at 2/11/2025 12:16 PM.  Learner: Patient  Readiness: Acceptance  Method: Explanation  Response: Verbalizes Understanding  Comment: Pt educated on spine precautions, log roll technique, LB dressing    Education Comments  No comments found.        Goals:   Encounter Problems       Encounter Problems (Active)       ADLs       Patient with complete upper body dressing with independent level of assistance donning and doffing all UE clothes with no adaptive equipment while edge of bed  (Progressing)       Start:  02/11/25    Expected End:  02/25/25            Patient with complete lower body dressing with modified independent level of assistance donning and doffing all LE clothes  with PRN adaptive equipment while edge of bed  (Progressing)       Start:  02/11/25    Expected End:  02/25/25            Patient will complete toileting including hygiene clothing management/hygiene with modified independent level of assistance and grab bars. (Progressing)       Start:  02/11/25    Expected End:  02/25/25               COGNITION/SAFETY       Patient will recall and adhere to spine precautions during all functional mobility/ADL tasks in order to  demonstrate improved understanding and promote healing post op (Progressing)       Start:  02/11/25    Expected End:  02/25/25               MOBILITY       Patient will perform Functional mobility mod  Household distances with modified independent level of assistance and least restrictive device in order to improve safety and functional mobility. (Progressing)       Start:  02/11/25    Expected End:  02/25/25               TRANSFERS       Patient will perform bed mobility modified independent level of assistance and bed rails in order to improve safety and independence with mobility (Progressing)       Start:  02/11/25    Expected End:  02/25/25            Patient will complete sit to stand transfer with modified independent level of assistance and least restrictive device in order to improve safety and prepare for out of bed mobility. (Progressing)       Start:  02/11/25    Expected End:  02/25/25                   Treatment Completed on Evaluation    Activities of Daily Living:          LE Dressing  LE Dressing: Yes  LE Dressing Adaptive Equipment: Reacher, Sock aide  Pants Level of Assistance: Contact guard, Minimal verbal cues  Sock Level of Assistance: Contact guard, Minimal verbal cues  LE Dressing Where Assessed: Chair  LE Dressing Comments: pt educated on LB dressing technique with and without AD. Pt practiced using reacher to saira underwear and used reacher and sock aid to don/doff socks.         02/11/25 at 12:18 PM   Lazara Ortiz, OT   Rehab Office: [phone]

## 2025-02-11 NOTE — PROGRESS NOTES
Pharmacy Medication History Review    Kallie Dale is a 59 y.o. female admitted for Lumbar radiculopathy. Pharmacy reviewed the patient's [id number] medications and allergies for accuracy.    Medications ADDED:  Tylenol  Ibuprofen  Medications CHANGED:  none  Medications REMOVED:   none     The list below reflects the updated PTA list.   Prior to Admission Medications   Prescriptions Last Dose Informant   acetaminophen (Tylenol) 500 mg tablet  Self   Sig: Take 2 tablets (1,000 mg) by mouth every 6 hours if needed for mild pain (1 - 3) or moderate pain (4 - 6).   chlorhexidine (Hibiclens) 4 % external liquid  Self   Sig: Apply topically once daily as needed for wound care for up to 5 days.   clotrimazole-betamethasone (Lotrisone) cream Not Taking Self   Sig: APPLY TO AFFECTED AREA TWICE A DAY   Patient not taking: Reported on 2/7/2025   escitalopram (Lexapro) 20 mg tablet 2/10/2025 Morning Self   Sig: Take 1 tablet (20 mg) by mouth once daily.   ibuprofen 200 mg tablet  Self   Sig: Take 4 tablets (800 mg) by mouth every 8 hours if needed for mild pain (1 - 3) or moderate pain (4 - 6).   metoprolol succinate XL (Toprol-XL) 50 mg 24 hr tablet 2/10/2025 Morning Self   Sig: Take 1 tablet (50 mg) by mouth once daily. Do not crush or chew.   valACYclovir (Valtrex) 500 mg tablet  Self   Sig: TAKE 1 TABLET BY MOUTH EVERY DAY   Patient taking differently: Take 1 tablet (500 mg) by mouth if needed.      Facility-Administered Medications: None        The list below reflects the updated allergy list. Please review each documented allergy for additional clarification and justification.  Allergies  Reviewed by Monika Parada on 2/11/2025   No Known Allergies         Patient accepts M2B at discharge.     Sources:   [facility]  Pharmacy dispense history  Patient interview Good historian  Chart Review  12/3/24 Office visit - Neurosurgery - [facility]      Additional Comments:  Patient reports still using Hibiclens.      MONIKA MOSS  "JUAN  Pharmacy Technician  02/11/25     Secure Chat preferred   If no response call [phone] or Triageera \"Med Rec\"    "

## 2025-02-11 NOTE — DISCHARGE SUMMARY
Discharge Diagnosis  Lumbar radiculopathy    Test Results Pending At Discharge  Pending Labs       No current pending labs.          Hospital Course  Kallie Dale is a 59 year old female with a past medical history of HTN, HLD and GERD who presented with neurogenic claudication and taken to the OR on 2/10 for L4/5 XLIF with percutaneous instrumentation. She was admitted to the neurosurgery service post operatively.       PT/OT evaluated patient and recommended low level of continued therapy for which referral placed for home health care.     On the day of discharge, the patient was seen and evaluated by the neurosurgery team and deemed suitable for discharge. The patient was given detailed discharge instructions and were scheduled to follow up as an outpatient.    Pertinent Physical Exam At Time of Discharge  Constitutional: A&Ox3, calm and cooperative, NAD.  Eyes: PERRL, EOMI.   ENMT: Moist mucous membranes, no apparent injuries or lesions.  Cardiovascular: Normal rate and regular rhythm. 2+ equal pulses of the distal extremities.  Respiratory/Thorax: CTAB, regular respirations on RA. Good symmetric chest expansion.   Gastrointestinal: Abdomen soft, non tender.   Neurological:   A&Ox3  Face symmetric, Facial SILT   Tongue midline and symmetric  RUE D5 / B5 / T5 / HG 5/ IO 5  LUE D5 / B5 / T5 / HG 5/ IO 5  RLE HF5 / KE 5/ DF 5/ PF 5  LLE HF5 / KE 5/ DF 5/ PF 5  No clonus, neg More  SILT  Psychological: Appropriate mood and behavior.   Skin: Warm and dry. Spinal incision C/D/I.     Home Medications     Medication List      START taking these medications     cyclobenzaprine 10 mg tablet; Commonly known as: Flexeril; Take 1 tablet   (10 mg) by mouth 3 times a day for 7 days.   oxyCODONE 5 mg immediate release tablet; Commonly known as: Roxicodone;   Take 1 tablet (5 mg) by mouth every 6 hours if needed for severe pain (7 -   10) for up to 7 days.   polyethylene glycol 17 gram packet; Commonly known as: Glycolax,    Miralax; Take 17 g by mouth 2 times a day for 7 days.   sennosides-docusate sodium 8.6-50 mg tablet; Commonly known as:   Abi-Colace; Take 2 tablets by mouth 2 times a day for 7 days.     CHANGE how you take these medications     * acetaminophen 500 mg tablet; Commonly known as: Tylenol; What changed:   Another medication with the same name was added. Make sure you understand   how and when to take each.   * acetaminophen 325 mg tablet; Commonly known as: Tylenol; Take 2   tablets (650 mg) by mouth every 6 hours for 7 days.; What changed: You   were already taking a medication with the same name, and this prescription   was added. Make sure you understand how and when to take each.   ibuprofen 200 mg tablet; Take 4 tablets (800 mg) by mouth every 8 hours   if needed for mild pain (1 - 3) or moderate pain (4 - 6). Do not restart   until discussed at follow up neurosurgery appointment; What changed:   additional instructions  * This list has 2 medication(s) that are the same as other medications   prescribed for you. Read the directions carefully, and ask your doctor or   other care provider to review them with you.     CONTINUE taking these medications     escitalopram 20 mg tablet; Commonly known as: Lexapro; Take 1 tablet (20   mg) by mouth once daily.   metoprolol succinate XL 50 mg 24 hr tablet; Commonly known as:   Toprol-XL; Take 1 tablet (50 mg) by mouth once daily. Do not crush or   chew.   valACYclovir 500 mg tablet; Commonly known as: Valtrex; TAKE 1 TABLET BY   MOUTH EVERY DAY     STOP taking these medications     chlorhexidine 4 % external liquid; Commonly known as: Hibiclens   clotrimazole-betamethasone cream; Commonly known as: Lotrisone     Outpatient Follow-Up  Future Appointments   Date Time Provider Department Center   2/26/2025  9:30 AM NEUROSURGERY [facility] NURSE [id number] [facility]   4/1/2025 11:00 AM Juwan Hickey MD [id number] [facility]       Holly Delgado PA-C    Total face to face  time spent with patient/family of >30 minutes, with >50% of the time spent discussing plan of care/management, counseling/educating on disease processes, explaining results of diagnostic testing.

## 2025-02-11 NOTE — PROGRESS NOTES
Patient is medically cleared for discharge today home with [facility] services AOC processed for SOC 2/12 or 2/13 in 24 to 48 hours. I will follow until discharged.

## 2025-02-11 NOTE — PROGRESS NOTES
"Kallie Dale is a 59 y.o. female on day 0 of admission presenting with Lumbar radiculopathy.    Subjective   Patient reported that numbness in her feet and shooting pains are improved       Objective     Physical Exam  A&Ox3  Face symmetric  RUE 5/5  LUE 5/5  RLE 5/5  LLE 5/5  Sensation intact to light touch throughout all extremities    Last Recorded Vitals  Blood pressure 101/66, pulse 67, temperature 36.2 °C (97.2 °F), temperature source Temporal, resp. rate 17, height 1.626 m (5' 4\"), weight 115 kg (252 lb 10.4 oz), SpO2 99%.  Intake/Output last 3 Shifts:  I/O last 3 completed shifts:  In: 1250 (10.9 mL/kg) [I.V.:1250 (10.9 mL/kg)]  Out: 880 (7.7 mL/kg) [Urine:730 (0.2 mL/kg/hr); Blood:150]  Weight: 114.6 kg     Relevant Results      Assessment/Plan   Assessment & Plan  Lumbar radiculopathy    Class 3 severe obesity due to excess calories without serious comorbidity with body mass index (BMI) of 40.0 to 44.9 in adult    Spondylolisthesis at L4-L5 level    Lumbar foraminal stenosis    Lumbar radiculopathy, chronic    Lumbar stenosis with neurogenic claudication    59yF h/o HTN, HLD, GERD, p/w neurogenic claudication, 2/10 L4/5 XLIF with percutaneous instrumentation    Patient doing well postoperatively    PLAN  Floor  No uprights  Void trial  PTOT  SCDs, SQH      Mark Rosario MD      "

## 2025-02-11 NOTE — PROGRESS NOTES
02/11/25 0800   Discharge Planning   Living Arrangements Alone   Support Systems Family members   Assistance Needed Yes   Type of Residence Private residence   Number of Stairs to Enter Residence 3   Number of Stairs Within Residence 12   Do you have animals or pets at home? Yes   Type of Animals or Pets Dog   Who is requesting discharge planning? Provider   Home or Post Acute Services In home services   Type of Home Care Services Home PT;Home OT   Expected Discharge Disposition Home H   Does the patient need discharge transport arranged? No   Financial Resource Strain   How hard is it for you to pay for the very basics like food, housing, medical care, and heating? Not hard   Housing Stability   In the last 12 months, was there a time when you were not able to pay the mortgage or rent on time? N   In the past 12 months, how many times have you moved where you were living? 0   At any time in the past 12 months, were you homeless or living in a shelter (including now)? N   Transportation Needs   In the past 12 months, has lack of transportation kept you from medical appointments or from getting medications? no   In the past 12 months, has lack of transportation kept you from meetings, work, or from getting things needed for daily living? No   Stroke Family Assessment   Stroke Family Assessment Needed No   Intensity of Service   Intensity of Service 0-30 min     I spoke with Kallie regarding discharge planning and home going needs. Patient states that she lives home alone where she was previously independent with ADL's she does have a walker, cane and grabber in the home. Patient states that she would like in home therapy and [facility] services is her AOC. I will continue to follow with a safe discharge plan.

## 2025-02-11 NOTE — HH CARE COORDINATION
Home Care received a Referral for Physical Therapy and Occupational Therapy. We have processed the referral for a Start of Care on 2.12 OR 2.13.     If you have any questions or concerns, please feel free to contact us at [phone]. Follow the prompts, enter your five digit zip code, and you will be directed to your care team on EAST 1.

## 2025-02-11 NOTE — CARE PLAN
The patient's goals for the shift include      The clinical goals for the shift include Pt will remain safe and pain controlled throughout the shift.    Pt remained safe and free of injury during night. Pain controlled with oxycodone. Aviles removed @0530 this morning. Sitting in a chair this morning. No other distress noted. Call light in reach. Resting quietly at this time.     Ssuan Stevenson RN

## 2025-02-18 ENCOUNTER — PATIENT OUTREACH (OUTPATIENT)
Dept: CARE COORDINATION | Facility: CLINIC | Age: 60
End: 2025-02-18
Payer: COMMERCIAL

## 2025-02-18 ENCOUNTER — HOME CARE VISIT (OUTPATIENT)
Dept: HOME HEALTH SERVICES | Facility: HOME HEALTH | Age: 60
End: 2025-02-18
Payer: COMMERCIAL

## 2025-02-18 PROCEDURE — G0151 HHCP-SERV OF PT,EA 15 MIN: HCPCS

## 2025-02-18 ASSESSMENT — ENCOUNTER SYMPTOMS
SUBJECTIVE PAIN PROGRESSION: GRADUALLY IMPROVING
PAIN LOCATION - PAIN SEVERITY: 7/10
PAIN LOCATION - PAIN SEVERITY: 7/10
PAIN: 1
LOWEST PAIN SEVERITY IN PAST 24 HOURS: 0/10
HIGHEST PAIN SEVERITY IN PAST 24 HOURS: 10/10
PAIN SEVERITY GOAL: 0/10
PAIN LOCATION: LEFT LEG
PAIN LOCATION: RIGHT LEG
PERSON REPORTING PAIN: PATIENT

## 2025-02-18 ASSESSMENT — ACTIVITIES OF DAILY LIVING (ADL)
ENTERING_EXITING_HOME: MINIMUM ASSIST
OASIS_M1830: 03

## 2025-02-18 NOTE — HOME HEALTH
"2 10 25 Dr. Juwan Hickey MD, decompression and fusion to low back, L4-L5.  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    Taught patient proper use of ice for post op recovery.  Observed the patient access her bathroom, bedroom, first and second floors, agreed with the patient that she did not need to use her wheeled walker or cane but that she could if she believed it would help.  Also spoke at length with patient about having a balanced approach to her recovery.  While I want her to walk on a regular basis (She walked for about 3.5 minutes when I challenged her to do so) and she indicated that \"it was good that you had me do that, I was not sure I could do that.\"    Discussed the \"BLT\" precautions of avoiding \"Bending, lifting, twisting\" more than 5 pounds until she hears differently from the surgeon or his representative.    Patient indicated that she will attend the scheduled revisit with the doctor's assistant in a few weeks but does not have a scheduled follow up with the surgeon until April 2025.      She believes that she will follow the recommendations made by the P.A. late this month regarding activity and possible outpatient P.T.  She surprised me somewhat by indicating that she did not see the need for additional visits at home as she is very happy with her current ability (although she knows not to \"push it\" too much either)."

## 2025-02-25 ENCOUNTER — TELEPHONE (OUTPATIENT)
Dept: NEUROSURGERY | Facility: HOSPITAL | Age: 60
End: 2025-02-25
Payer: COMMERCIAL

## 2025-02-25 NOTE — TELEPHONE ENCOUNTER
I had the pleasure of speaking with Kallie Dale for wound/incision check. She has Derma-bond covering her incision. Her mother and son visualize the incision regularly and note the incision is free of redness, swelling, and discharge. She denies any fevers. We reviewed incision care and follow up appointment. Kallie Dale agrees and has no further questions. They will see Dr. Hickey in a few weeks for follow up.

## 2025-02-26 ENCOUNTER — APPOINTMENT (OUTPATIENT)
Dept: NEUROSURGERY | Facility: HOSPITAL | Age: 60
End: 2025-02-26
Payer: COMMERCIAL

## 2025-03-12 RX ORDER — HYDROCODONE BITARTRATE AND ACETAMINOPHEN 5; 325 MG/1; MG/1
1 TABLET ORAL EVERY 4 HOURS PRN
COMMUNITY
Start: 2024-04-02

## 2025-03-12 RX ORDER — AMOXICILLIN 500 MG/1
CAPSULE ORAL
COMMUNITY
Start: 2024-10-28

## 2025-03-18 ENCOUNTER — PATIENT OUTREACH (OUTPATIENT)
Dept: CARE COORDINATION | Facility: CLINIC | Age: 60
End: 2025-03-18
Payer: COMMERCIAL

## 2025-03-24 DIAGNOSIS — Z09 POSTOPERATIVE FOLLOW-UP: ICD-10-CM

## 2025-04-01 ENCOUNTER — APPOINTMENT (OUTPATIENT)
Dept: NEUROSURGERY | Facility: CLINIC | Age: 60
End: 2025-04-01
Payer: COMMERCIAL

## 2025-04-15 ENCOUNTER — HOSPITAL ENCOUNTER (OUTPATIENT)
Dept: RADIOLOGY | Facility: CLINIC | Age: 60
Discharge: HOME | End: 2025-04-15
Payer: COMMERCIAL

## 2025-04-15 ENCOUNTER — OFFICE VISIT (OUTPATIENT)
Dept: NEUROSURGERY | Facility: CLINIC | Age: 60
End: 2025-04-15
Payer: COMMERCIAL

## 2025-04-15 VITALS
DIASTOLIC BLOOD PRESSURE: 92 MMHG | TEMPERATURE: 97.2 F | HEART RATE: 76 BPM | HEIGHT: 64 IN | WEIGHT: 242 LBS | BODY MASS INDEX: 41.32 KG/M2 | SYSTOLIC BLOOD PRESSURE: 140 MMHG

## 2025-04-15 DIAGNOSIS — Z09 POSTOPERATIVE FOLLOW-UP: ICD-10-CM

## 2025-04-15 DIAGNOSIS — Z98.1 S/P LUMBAR SPINAL FUSION: Primary | ICD-10-CM

## 2025-04-15 PROCEDURE — 72100 X-RAY EXAM L-S SPINE 2/3 VWS: CPT | Performed by: RADIOLOGY

## 2025-04-15 PROCEDURE — 3077F SYST BP >= 140 MM HG: CPT | Performed by: NEUROLOGICAL SURGERY

## 2025-04-15 PROCEDURE — 3008F BODY MASS INDEX DOCD: CPT | Performed by: NEUROLOGICAL SURGERY

## 2025-04-15 PROCEDURE — 99211 OFF/OP EST MAY X REQ PHY/QHP: CPT | Performed by: NEUROLOGICAL SURGERY

## 2025-04-15 PROCEDURE — 72100 X-RAY EXAM L-S SPINE 2/3 VWS: CPT

## 2025-04-15 PROCEDURE — 1036F TOBACCO NON-USER: CPT | Performed by: NEUROLOGICAL SURGERY

## 2025-04-15 PROCEDURE — 3080F DIAST BP >= 90 MM HG: CPT | Performed by: NEUROLOGICAL SURGERY

## 2025-04-15 ASSESSMENT — PAIN SCALES - GENERAL: PAINLEVEL_OUTOF10: 0-NO PAIN

## 2025-04-15 NOTE — PROGRESS NOTES
I just had the pleasure of seeing Ms. Dale back in the Neurosurgery Spine Clinic at the [facility]. She is a very pleasant 59 -year-old female, who recently underwent a L4-5 fusion with me on 2/10/25 and is status post 7 week out from her surgery.     Ms. Dale  is doing well from her surgery.    Alert and oriented  Motor strength baseline with no new weakness.   Sensory exam grossly intact  Gait Normal  Incision well healed.       AP and lateral X rays of the lumbar spine shows well placed instrumentation with no evidence of any instrumentation failure.    There are no concerning neurological issues at this point. Discussed benefit of PT and a referral for outpatient PT provided.   At his point of time, we will see her  back in clinic at 6 months from the time of surgery  with an AP and lateral X rays of the lumbar spine.  It was a pleasure to participate in Ms. Dale  care.   All questions were answered to the patients satisfaction and she explained understanding of the further treatment plan.    Juwan Hickey MD, [facility]   of Neurological Surgery  [employer]  Attending Surgeon  Director - Minimally Invasive Spine Surgery  [place], [place]    ---Some of this note was completed using Dragon voice recognition technology and sometimes the software misinterprets words. This may include unintended errors with respect to translation of words, typographical errors or grammar errors which may not have been identified prior to finalization of the chart note. Please take this into account when reading this note---

## (undated) DEVICE — PAD, GROUNDING, ELECTROSURGICAL, W/9 FT CABLE, POLYHESIVE II, ADULT, LF

## (undated) DEVICE — SEALANT, HEMOSTATIC, FLOSEAL, 10 ML

## (undated) DEVICE — KIT, XLIF NVM5 DISP

## (undated) DEVICE — MODULE, NEEDLE EMG M5

## (undated) DEVICE — Device

## (undated) DEVICE — MANIFOLD, 4 PORT NEPTUNE STANDARD

## (undated) DEVICE — ELECTRODE, ELECTROSURGICAL, BLADE EXT 4 INCH, INSULATED

## (undated) DEVICE — SPONGE GAUZE, XRAY SC+RFID, 4X4 16 PLY, STERILE

## (undated) DEVICE — EXCELSIUS GPS ARM DRAPE

## (undated) DEVICE — WOUND SYSTEM, DEBRIDEMENT & CLEANING, O.R DUOPAK

## (undated) DEVICE — ADHESIVE, SKIN, DERMABOND ADVANCED, 15CM, PEN-STYLE

## (undated) DEVICE — DRAPE, INCISE, DIRECTIONAL, FENESTRATED, PEDIATRIC, STERILE

## (undated) DEVICE — SYRINGE, 20 CC, LUER LOCK, MONOJECT, W/O CAP, LF

## (undated) DEVICE — DRAPE, C-ARMOR KIT

## (undated) DEVICE — SUTURE, STRATAFIX, SPIRAL MONOCRYL PLUS, 3-0, PS-2 45CM, UNDYED

## (undated) DEVICE — DRAPE, TOWEL, STERI DRAPE, 17 X 11 IN, PLASTIC, STERILE

## (undated) DEVICE — MARKER, SKIN, RULER AND LABEL PACK, CUSTOM

## (undated) DEVICE — SUTURE, VICRYL PLUS, 0, 8X18IN, CT-1, UND, BRAIDED

## (undated) DEVICE — ACCESS KIT, MAXCESS 4 SURGICAL

## (undated) DEVICE — APPLICATOR, CHLORAPREP, W/ORANGE TINT, 26ML

## (undated) DEVICE — CATHETER, IV, ANGIOCATH, 14 G X 1.88 IN, FEP POLYMER

## (undated) DEVICE — TAPE, SILK, DURAPORE, 3 IN X 10 YD, LF

## (undated) DEVICE — INSTRUMENT, DISSECTING, ENDOSCOPIC, PEANUT, 5 MM, STERILE

## (undated) DEVICE — ELECTRODE, ELECTROSURGICAL, BLADE, EXTENDED, 6.5 IN, STAINLESS STEEL

## (undated) DEVICE — COVER, CART, 45 X 27 X 48 IN, CLEAR

## (undated) DEVICE — SUTURE, VICRYL, 0, 18 IN, CT-1, UNDYED